# Patient Record
Sex: MALE | Race: WHITE | Employment: FULL TIME | URBAN - METROPOLITAN AREA
[De-identification: names, ages, dates, MRNs, and addresses within clinical notes are randomized per-mention and may not be internally consistent; named-entity substitution may affect disease eponyms.]

---

## 2017-02-15 ENCOUNTER — APPOINTMENT (OUTPATIENT)
Dept: LAB | Age: 52
End: 2017-02-15
Attending: DERMATOLOGY
Payer: COMMERCIAL

## 2017-02-15 DIAGNOSIS — D48.5 NEOPLASM OF UNCERTAIN BEHAVIOR OF SKIN: ICD-10-CM

## 2017-02-15 PROCEDURE — 88342 IMHCHEM/IMCYTCHM 1ST ANTB: CPT

## 2017-04-13 ENCOUNTER — LAB ENCOUNTER (OUTPATIENT)
Dept: LAB | Age: 52
End: 2017-04-13
Attending: FAMILY MEDICINE
Payer: COMMERCIAL

## 2017-04-13 DIAGNOSIS — E11.9 DIABETES MELLITUS (HCC): Primary | ICD-10-CM

## 2017-04-13 PROCEDURE — 82570 ASSAY OF URINE CREATININE: CPT

## 2017-04-13 PROCEDURE — 82043 UR ALBUMIN QUANTITATIVE: CPT

## 2017-06-10 ENCOUNTER — HOSPITAL ENCOUNTER (EMERGENCY)
Facility: HOSPITAL | Age: 52
Discharge: HOME | End: 2017-06-10
Attending: EMERGENCY MEDICINE
Payer: COMMERCIAL

## 2017-06-10 VITALS
RESPIRATION RATE: 20 BRPM | DIASTOLIC BLOOD PRESSURE: 68 MMHG | OXYGEN SATURATION: 97 % | HEIGHT: 72 IN | BODY MASS INDEX: 40.63 KG/M2 | TEMPERATURE: 99 F | HEART RATE: 72 BPM | WEIGHT: 300 LBS | SYSTOLIC BLOOD PRESSURE: 126 MMHG

## 2017-06-10 DIAGNOSIS — L97.509: Primary | ICD-10-CM

## 2017-06-10 DIAGNOSIS — E13.621: Primary | ICD-10-CM

## 2017-06-10 DIAGNOSIS — E87.6 HYPOKALEMIA: ICD-10-CM

## 2017-06-10 PROCEDURE — 85025 COMPLETE CBC W/AUTO DIFF WBC: CPT | Performed by: EMERGENCY MEDICINE

## 2017-06-10 PROCEDURE — 85007 BL SMEAR W/DIFF WBC COUNT: CPT | Performed by: EMERGENCY MEDICINE

## 2017-06-10 PROCEDURE — 99284 EMERGENCY DEPT VISIT MOD MDM: CPT

## 2017-06-10 PROCEDURE — S0077 INJECTION, CLINDAMYCIN PHOSP: HCPCS | Performed by: EMERGENCY MEDICINE

## 2017-06-10 PROCEDURE — 80048 BASIC METABOLIC PNL TOTAL CA: CPT | Performed by: EMERGENCY MEDICINE

## 2017-06-10 PROCEDURE — 85610 PROTHROMBIN TIME: CPT | Performed by: EMERGENCY MEDICINE

## 2017-06-10 PROCEDURE — 85027 COMPLETE CBC AUTOMATED: CPT | Performed by: EMERGENCY MEDICINE

## 2017-06-10 PROCEDURE — 96365 THER/PROPH/DIAG IV INF INIT: CPT

## 2017-06-10 RX ORDER — CLINDAMYCIN HYDROCHLORIDE 300 MG/1
300 CAPSULE ORAL 3 TIMES DAILY
Qty: 30 CAPSULE | Refills: 0 | Status: SHIPPED | OUTPATIENT
Start: 2017-06-10 | End: 2017-06-20

## 2017-06-10 RX ORDER — CLINDAMYCIN PHOSPHATE 600 MG/50ML
600 INJECTION INTRAVENOUS ONCE
Status: COMPLETED | OUTPATIENT
Start: 2017-06-10 | End: 2017-06-10

## 2017-06-10 RX ORDER — POTASSIUM CHLORIDE 20 MEQ/1
40 TABLET, EXTENDED RELEASE ORAL ONCE
Status: COMPLETED | OUTPATIENT
Start: 2017-06-10 | End: 2017-06-10

## 2017-06-10 RX ORDER — DIAPER,BRIEF,INFANT-TODD,DISP
EACH MISCELLANEOUS AS NEEDED
Status: DISCONTINUED | OUTPATIENT
Start: 2017-06-10 | End: 2017-06-11

## 2017-06-11 NOTE — ED PROVIDER NOTES
Patient Seen in: [facility] AND [facility] Emergency Department    History   Patient presents with: Toe Pain    Stated Complaint: right big toe infection     HPI    Pt is a 44 yo M who p/w right great toe blister that is progressively worse ×2 days.   Denies any (5 mg total) by mouth once daily. Potassium Chloride ER (KLOR-CON M20) 20 MEQ Oral Tab CR,  Take 1 tablet (20 mEq total) by mouth once daily.    DiltiaZEM HCl ER Coated Beads 240 MG Oral Capsule SR 24 Hr,  Take 1 capsule (240 mg total) by mouth 2 (two) ti Resp 06/10/17 1949 20   Temp 06/10/17 1949 98.8 °F (37.1 °C)   Temp src --    SpO2 06/10/17 2139 94 %   O2 Device 06/10/17 2139 None (Room air)       Current:/68 mmHg  Pulse 68  Temp(Src) 98.8 °F (37.1 °C)  Resp 19  Ht 182.9 cm (6')  Wt 136.079 kg given clindamycin IV and advised to start oral antibiotics with close f/u next week. Pt does have appointment with podiatry in 2 weeks as well. Advised to check INR next week and evaluate for worsening signs of infection daily.    POtassium repleted, pt not

## 2017-06-23 PROBLEM — L97.519 ULCER OF RIGHT GREAT TOE DUE TO DIABETES MELLITUS (HCC): Status: ACTIVE | Noted: 2017-06-23

## 2017-06-23 PROBLEM — E11.621 ULCER OF RIGHT GREAT TOE DUE TO DIABETES MELLITUS (HCC): Status: ACTIVE | Noted: 2017-06-23

## 2018-07-14 PROCEDURE — 81003 URINALYSIS AUTO W/O SCOPE: CPT | Performed by: FAMILY MEDICINE

## 2018-07-17 PROBLEM — E11.42 DIABETIC POLYNEUROPATHY ASSOCIATED WITH TYPE 2 DIABETES MELLITUS (HCC): Status: ACTIVE | Noted: 2018-07-17

## 2018-07-17 PROBLEM — K42.9 UMBILICAL HERNIA WITHOUT OBSTRUCTION AND WITHOUT GANGRENE: Status: ACTIVE | Noted: 2018-07-17

## 2019-05-21 PROBLEM — E55.9 VITAMIN D DEFICIENCY: Status: ACTIVE | Noted: 2019-05-21

## 2019-05-21 PROBLEM — L84 FOOT CALLUS: Status: ACTIVE | Noted: 2019-05-21

## 2020-07-24 PROBLEM — R06.00 DOE (DYSPNEA ON EXERTION): Status: ACTIVE | Noted: 2020-07-24

## 2020-07-24 PROBLEM — R06.09 DOE (DYSPNEA ON EXERTION): Status: ACTIVE | Noted: 2020-07-24

## 2020-08-20 RX ORDER — CHOLECALCIFEROL (VITAMIN D3) 50 MCG
TABLET ORAL DAILY
COMMUNITY

## 2020-08-20 RX ORDER — FUROSEMIDE 20 MG/1
20 TABLET ORAL
COMMUNITY
End: 2020-10-15

## 2020-08-21 ENCOUNTER — LAB ENCOUNTER (OUTPATIENT)
Dept: LAB | Facility: HOSPITAL | Age: 55
End: 2020-08-21
Attending: INTERNAL MEDICINE
Payer: COMMERCIAL

## 2020-08-21 DIAGNOSIS — Z20.822 ENCOUNTER FOR PREOPERATIVE SCREENING LABORATORY TESTING FOR COVID-19 VIRUS: Primary | ICD-10-CM

## 2020-08-21 DIAGNOSIS — Z01.812 ENCOUNTER FOR PREOPERATIVE SCREENING LABORATORY TESTING FOR COVID-19 VIRUS: Primary | ICD-10-CM

## 2020-08-22 LAB — SARS-COV-2 RNA RESP QL NAA+PROBE: NOT DETECTED

## 2020-08-24 ENCOUNTER — HOSPITAL ENCOUNTER (OUTPATIENT)
Dept: INTERVENTIONAL RADIOLOGY/VASCULAR | Facility: HOSPITAL | Age: 55
Discharge: HOME | End: 2020-08-24
Attending: INTERNAL MEDICINE | Admitting: INTERNAL MEDICINE
Payer: COMMERCIAL

## 2020-08-24 VITALS
SYSTOLIC BLOOD PRESSURE: 137 MMHG | HEART RATE: 64 BPM | OXYGEN SATURATION: 94 % | WEIGHT: 315 LBS | DIASTOLIC BLOOD PRESSURE: 69 MMHG | TEMPERATURE: 99 F | RESPIRATION RATE: 17 BRPM | BODY MASS INDEX: 43 KG/M2

## 2020-08-24 DIAGNOSIS — R06.00 DYSPNEA ON EXERTION: ICD-10-CM

## 2020-08-24 DIAGNOSIS — R94.39 ABNORMAL NUCLEAR STRESS TEST: ICD-10-CM

## 2020-08-24 DIAGNOSIS — Z01.818 PREOP TESTING: Primary | ICD-10-CM

## 2020-08-24 LAB
GLUCOSE BLDC GLUCOMTR-MCNC: 128 MG/DL (ref 70–99)
INR BLD: 1.74 (ref 0.9–1.2)
PROTHROMBIN TIME: 20 SECONDS (ref 11.8–14.5)

## 2020-08-24 PROCEDURE — 82962 GLUCOSE BLOOD TEST: CPT

## 2020-08-24 PROCEDURE — 85610 PROTHROMBIN TIME: CPT | Performed by: INTERNAL MEDICINE

## 2020-08-24 PROCEDURE — 99152 MOD SED SAME PHYS/QHP 5/>YRS: CPT

## 2020-08-24 PROCEDURE — 4A023N7 MEASUREMENT OF CARDIAC SAMPLING AND PRESSURE, LEFT HEART, PERCUTANEOUS APPROACH: ICD-10-PCS | Performed by: INTERNAL MEDICINE

## 2020-08-24 PROCEDURE — 36415 COLL VENOUS BLD VENIPUNCTURE: CPT

## 2020-08-24 PROCEDURE — 93458 L HRT ARTERY/VENTRICLE ANGIO: CPT

## 2020-08-24 PROCEDURE — B2151ZZ FLUOROSCOPY OF LEFT HEART USING LOW OSMOLAR CONTRAST: ICD-10-PCS | Performed by: INTERNAL MEDICINE

## 2020-08-24 PROCEDURE — B2111ZZ FLUOROSCOPY OF MULTIPLE CORONARY ARTERIES USING LOW OSMOLAR CONTRAST: ICD-10-PCS | Performed by: INTERNAL MEDICINE

## 2020-08-24 PROCEDURE — 99153 MOD SED SAME PHYS/QHP EA: CPT

## 2020-08-24 RX ORDER — MIDAZOLAM HYDROCHLORIDE 1 MG/ML
INJECTION INTRAMUSCULAR; INTRAVENOUS
Status: COMPLETED
Start: 2020-08-24 | End: 2020-08-24

## 2020-08-24 RX ORDER — LIDOCAINE HYDROCHLORIDE 20 MG/ML
INJECTION, SOLUTION EPIDURAL; INFILTRATION; INTRACAUDAL; PERINEURAL
Status: COMPLETED
Start: 2020-08-24 | End: 2020-08-24

## 2020-08-24 RX ORDER — SODIUM CHLORIDE 9 MG/ML
1 INJECTION, SOLUTION INTRAVENOUS CONTINUOUS
Status: DISCONTINUED | OUTPATIENT
Start: 2020-08-24 | End: 2020-08-24

## 2020-08-24 RX ORDER — ASPIRIN 81 MG/1
TABLET, CHEWABLE ORAL
Status: COMPLETED
Start: 2020-08-24 | End: 2020-08-24

## 2020-08-24 RX ORDER — VERAPAMIL HYDROCHLORIDE 2.5 MG/ML
INJECTION, SOLUTION INTRAVENOUS
Status: COMPLETED
Start: 2020-08-24 | End: 2020-08-24

## 2020-08-24 RX ORDER — ASPIRIN 81 MG/1
324 TABLET, CHEWABLE ORAL ONCE
Status: COMPLETED | OUTPATIENT
Start: 2020-08-24 | End: 2020-08-24

## 2020-08-24 RX ORDER — NITROGLYCERIN 20 MG/100ML
INJECTION INTRAVENOUS
Status: COMPLETED
Start: 2020-08-24 | End: 2020-08-24

## 2020-08-24 RX ORDER — SODIUM CHLORIDE 9 MG/ML
INJECTION, SOLUTION INTRAVENOUS CONTINUOUS
Status: DISCONTINUED | OUTPATIENT
Start: 2020-08-24 | End: 2020-08-24

## 2020-08-24 RX ADMIN — SODIUM CHLORIDE 1 ML/KG/HR: 9 INJECTION, SOLUTION INTRAVENOUS at 06:45:00

## 2020-08-24 RX ADMIN — ASPIRIN: 81 TABLET, CHEWABLE ORAL at 07:15:00

## 2020-08-24 NOTE — INTERVAL H&P NOTE
Pre-op Diagnosis: * No pre-op diagnosis entered *    The above referenced H&P was reviewed by Marcella Stevens MD on 8/24/2020, the patient was examined and no significant changes have occurred in the patient's condition since the H&P was performed.   I discuss

## 2020-08-24 NOTE — DIETARY NOTE
NUTRITION EDUCATION NOTE    Received consult for nutrition education per cardiac rehab order set. Appropriate education and handout(s) provided. See education section of Epic for specifics.     Irma Angel RDN, LDN  Clinical Nutrition  Ext [phone]

## 2020-08-24 NOTE — IVS NOTE
Patient is able to sit up and ambulate without difficulty. Patient voided and tolerated food/fluids. VSS. Procedure site remains dry and intact with good circulation, motion, sensation. No signs/symptoms of bleeding noted.    Dr Reyna Grayson MD spoke with jemima

## 2020-08-30 ENCOUNTER — HOSPITAL ENCOUNTER (OUTPATIENT)
Facility: HOSPITAL | Age: 55
Setting detail: OBSERVATION
Discharge: HOME | End: 2020-09-03
Attending: HOSPITALIST | Admitting: HOSPITALIST
Payer: COMMERCIAL

## 2020-08-30 PROBLEM — K35.80 ACUTE APPENDICITIS: Status: ACTIVE | Noted: 2020-08-30

## 2020-08-30 LAB
GLUCOSE BLDC GLUCOMTR-MCNC: 128 MG/DL (ref 70–99)
SARS-COV-2 RNA RESP QL NAA+PROBE: NOT DETECTED

## 2020-08-30 PROCEDURE — 82962 GLUCOSE BLOOD TEST: CPT

## 2020-08-30 RX ORDER — ACETAMINOPHEN 325 MG/1
650 TABLET ORAL EVERY 6 HOURS PRN
Status: DISCONTINUED | OUTPATIENT
Start: 2020-08-30 | End: 2020-09-03

## 2020-08-30 RX ORDER — HYDROMORPHONE HYDROCHLORIDE 1 MG/ML
1 INJECTION, SOLUTION INTRAMUSCULAR; INTRAVENOUS; SUBCUTANEOUS
Status: DISCONTINUED | OUTPATIENT
Start: 2020-08-30 | End: 2020-09-03

## 2020-08-30 RX ORDER — SODIUM CHLORIDE 0.9 % (FLUSH) 0.9 %
3 SYRINGE (ML) INJECTION AS NEEDED
Status: DISCONTINUED | OUTPATIENT
Start: 2020-08-30 | End: 2020-09-03

## 2020-08-30 RX ORDER — NICOTINE 21 MG/24HR
1 PATCH, TRANSDERMAL 24 HOURS TRANSDERMAL DAILY
Status: DISCONTINUED | OUTPATIENT
Start: 2020-08-30 | End: 2020-09-03

## 2020-08-30 RX ORDER — HYDROCODONE BITARTRATE AND ACETAMINOPHEN 5; 325 MG/1; MG/1
1 TABLET ORAL EVERY 6 HOURS PRN
Status: DISCONTINUED | OUTPATIENT
Start: 2020-08-30 | End: 2020-09-03

## 2020-08-30 RX ORDER — ATORVASTATIN CALCIUM 10 MG/1
10 TABLET, FILM COATED ORAL NIGHTLY
Status: DISCONTINUED | OUTPATIENT
Start: 2020-08-30 | End: 2020-09-03

## 2020-08-30 RX ORDER — SODIUM CHLORIDE 9 MG/ML
INJECTION, SOLUTION INTRAVENOUS CONTINUOUS
Status: DISCONTINUED | OUTPATIENT
Start: 2020-08-30 | End: 2020-09-03

## 2020-08-30 RX ORDER — POLYETHYLENE GLYCOL 3350 17 G/17G
17 POWDER, FOR SOLUTION ORAL DAILY PRN
Status: DISCONTINUED | OUTPATIENT
Start: 2020-08-30 | End: 2020-09-01

## 2020-08-30 RX ORDER — DEXTROSE MONOHYDRATE 25 G/50ML
50 INJECTION, SOLUTION INTRAVENOUS
Status: DISCONTINUED | OUTPATIENT
Start: 2020-08-30 | End: 2020-09-03

## 2020-08-30 RX ORDER — HYDROMORPHONE HYDROCHLORIDE 1 MG/ML
0.5 INJECTION, SOLUTION INTRAMUSCULAR; INTRAVENOUS; SUBCUTANEOUS
Status: DISCONTINUED | OUTPATIENT
Start: 2020-08-30 | End: 2020-09-03

## 2020-08-30 RX ORDER — ALLOPURINOL 300 MG/1
300 TABLET ORAL DAILY
Status: DISCONTINUED | OUTPATIENT
Start: 2020-08-31 | End: 2020-09-03

## 2020-08-30 RX ORDER — ONDANSETRON 2 MG/ML
4 INJECTION INTRAMUSCULAR; INTRAVENOUS EVERY 6 HOURS PRN
Status: DISCONTINUED | OUTPATIENT
Start: 2020-08-30 | End: 2020-09-03

## 2020-08-30 RX ORDER — PANTOPRAZOLE SODIUM 40 MG/1
40 TABLET, DELAYED RELEASE ORAL
Status: DISCONTINUED | OUTPATIENT
Start: 2020-08-30 | End: 2020-09-03

## 2020-08-30 RX ORDER — BISACODYL 10 MG
10 SUPPOSITORY, RECTAL RECTAL
Status: DISCONTINUED | OUTPATIENT
Start: 2020-08-30 | End: 2020-09-03

## 2020-08-30 NOTE — PROGRESS NOTES
[facility] Pharmacy Note: Antimicrobial Weight Based Dose Adjustment for: piperacillin/tazobactam (Ino Mcdermott)    Minh Fisher is a 60year old patient who has been prescribed piperacillin/tazobactam (ZOSYN) 3.375 g every 8 hours.     Estimated Creatinine Clearance:

## 2020-08-30 NOTE — RESPIRATORY THERAPY NOTE
Pt states he wears CPAP at home and wants to use it during his hospital stay. Pt uses nasal mask with CPAP of 15 cmH2O.

## 2020-08-30 NOTE — CONSULTS
[address]  AGE:[birth year]  UCQ:[id number]  LOS:0    Date of Admission:  8/30/2020  Date of Consult:  8/30/2020 drugs. Allergies:    Lovenox [Enoxaparin]    RASH  Heparin                 RASH    Medications:   No current facility-administered medications for this encounter. Review of Systems:   Pertinent items are noted in HPI.   Constitutional: negative  Eye 9-4-15 / EXP 12-3-15     Status post arthroscopy of right knee     Knee effusion, right     Displacement of lumbar intervertebral disc without myelopathy     Tear of meniscus of right knee, subsequent encounter     Essential hypertension with goal blood pr counseling/coordination of care:  45 Minutes  Total time spent with patient:  39 Minutes   D/w Dr. Bonifacio Morris

## 2020-08-30 NOTE — H&P
[facility] Hospitalist H&P       CC: abd pain     PCP: Stacie Flores DO    History of Present Illness: Patient is a 60year old male with PMH sig for DM c/b nephropathy, HTN, HLD, persistent Afib on warfarin, JACKY, morbid obesity, CKD stage 3 baseline cre 1.2-1. week      Drinks per session: 3 or 4      Binge frequency: Never      Comment: social       Fam Hx  Family History   Problem Relation Age of Onset   • Diabetes Father    • Heart Disorder Father         MI   • Other (Other) Father         renal failure   • male with PMH sig for DM c/b nephropathy, HTN, HLD, persistent Afib on warfarin, JACKY, morbid obesity, CKD stage 3 baseline cre 1.2-1.6, active tobacco use, recent normal angiogram, who presents with RLQ abd pain, noted to have acute appendicitis on CT. as outlined    Thank Gely Yoder MD    [facility] Hospitalist  Answering Service number: [phone]

## 2020-08-31 LAB
ALBUMIN SERPL-MCNC: 2.7 G/DL (ref 3.4–5)
ALBUMIN/GLOB SERPL: 0.7 {RATIO} (ref 1–2)
ALP LIVER SERPL-CCNC: 67 U/L (ref 45–117)
ALT SERPL-CCNC: 19 U/L (ref 16–61)
ANION GAP SERPL CALC-SCNC: 4 MMOL/L (ref 0–18)
APTT PPP: 35.1 SECONDS (ref 23.2–35.3)
AST SERPL-CCNC: 16 U/L (ref 15–37)
BASOPHILS # BLD AUTO: 0.05 X10(3) UL (ref 0–0.2)
BASOPHILS NFR BLD AUTO: 0.4 %
BILIRUB SERPL-MCNC: 1.6 MG/DL (ref 0.1–2)
BUN BLD-MCNC: 30 MG/DL (ref 7–18)
BUN/CREAT SERPL: 18.6 (ref 10–20)
CALCIUM BLD-MCNC: 8.7 MG/DL (ref 8.5–10.1)
CHLORIDE SERPL-SCNC: 99 MMOL/L (ref 98–112)
CO2 SERPL-SCNC: 32 MMOL/L (ref 21–32)
CREAT BLD-MCNC: 1.61 MG/DL (ref 0.7–1.3)
DEPRECATED RDW RBC AUTO: 42.1 FL (ref 35.1–46.3)
EOSINOPHIL # BLD AUTO: 0.06 X10(3) UL (ref 0–0.7)
EOSINOPHIL NFR BLD AUTO: 0.5 %
ERYTHROCYTE [DISTWIDTH] IN BLOOD BY AUTOMATED COUNT: 12.9 % (ref 11–15)
EST. AVERAGE GLUCOSE BLD GHB EST-MCNC: 146 MG/DL (ref 68–126)
GLOBULIN PLAS-MCNC: 3.8 G/DL (ref 2.8–4.4)
GLUCOSE BLD-MCNC: 114 MG/DL (ref 70–99)
GLUCOSE BLDC GLUCOMTR-MCNC: 117 MG/DL (ref 70–99)
GLUCOSE BLDC GLUCOMTR-MCNC: 123 MG/DL (ref 70–99)
GLUCOSE BLDC GLUCOMTR-MCNC: 131 MG/DL (ref 70–99)
GLUCOSE BLDC GLUCOMTR-MCNC: 135 MG/DL (ref 70–99)
GLUCOSE BLDC GLUCOMTR-MCNC: 166 MG/DL (ref 70–99)
GLUCOSE BLDC GLUCOMTR-MCNC: 167 MG/DL (ref 70–99)
HAV IGM SER QL: 1.4 MG/DL (ref 1.6–2.6)
HBA1C MFR BLD HPLC: 6.7 % (ref ?–5.7)
HCT VFR BLD AUTO: 46.2 % (ref 39–53)
HGB BLD-MCNC: 15.7 G/DL (ref 13–17.5)
IMM GRANULOCYTES # BLD AUTO: 0.05 X10(3) UL (ref 0–1)
IMM GRANULOCYTES NFR BLD: 0.4 %
INR BLD: 1.71 (ref 0.9–1.2)
LDH SERPL L TO P-CCNC: 152 U/L
LYMPHOCYTES # BLD AUTO: 1.45 X10(3) UL (ref 1–4)
LYMPHOCYTES NFR BLD AUTO: 11.5 %
M PROTEIN MFR SERPL ELPH: 6.5 G/DL (ref 6.4–8.2)
MCH RBC QN AUTO: 30.2 PG (ref 26–34)
MCHC RBC AUTO-ENTMCNC: 34 G/DL (ref 31–37)
MCV RBC AUTO: 88.8 FL (ref 80–100)
MONOCYTES # BLD AUTO: 1.02 X10(3) UL (ref 0.1–1)
MONOCYTES NFR BLD AUTO: 8.1 %
NEUTROPHILS # BLD AUTO: 10.03 X10 (3) UL (ref 1.5–7.7)
NEUTROPHILS # BLD AUTO: 10.03 X10(3) UL (ref 1.5–7.7)
NEUTROPHILS NFR BLD AUTO: 79.1 %
OSMOLALITY SERPL CALC.SUM OF ELEC: 287 MOSM/KG (ref 275–295)
PHOSPHATE SERPL-MCNC: 1.8 MG/DL (ref 2.5–4.9)
PLATELET # BLD AUTO: 215 10(3)UL (ref 150–450)
POTASSIUM SERPL-SCNC: 3.5 MMOL/L (ref 3.5–5.1)
PROTHROMBIN TIME: 19.8 SECONDS (ref 11.8–14.5)
RBC # BLD AUTO: 5.2 X10(6)UL (ref 4.3–5.7)
SODIUM SERPL-SCNC: 135 MMOL/L (ref 136–145)
WBC # BLD AUTO: 12.7 X10(3) UL (ref 4–11)

## 2020-08-31 PROCEDURE — 82962 GLUCOSE BLOOD TEST: CPT

## 2020-08-31 PROCEDURE — 83615 LACTATE (LD) (LDH) ENZYME: CPT | Performed by: SURGERY

## 2020-08-31 PROCEDURE — 85610 PROTHROMBIN TIME: CPT | Performed by: HOSPITALIST

## 2020-08-31 PROCEDURE — 80053 COMPREHEN METABOLIC PANEL: CPT | Performed by: HOSPITALIST

## 2020-08-31 PROCEDURE — 85025 COMPLETE CBC W/AUTO DIFF WBC: CPT | Performed by: HOSPITALIST

## 2020-08-31 PROCEDURE — 84100 ASSAY OF PHOSPHORUS: CPT | Performed by: SURGERY

## 2020-08-31 PROCEDURE — 94660 CPAP INITIATION&MGMT: CPT

## 2020-08-31 PROCEDURE — 85730 THROMBOPLASTIN TIME PARTIAL: CPT | Performed by: SURGERY

## 2020-08-31 PROCEDURE — 83735 ASSAY OF MAGNESIUM: CPT | Performed by: SURGERY

## 2020-08-31 PROCEDURE — 83036 HEMOGLOBIN GLYCOSYLATED A1C: CPT | Performed by: HOSPITALIST

## 2020-08-31 RX ORDER — POTASSIUM CHLORIDE 20 MEQ/1
40 TABLET, EXTENDED RELEASE ORAL EVERY 4 HOURS
Status: COMPLETED | OUTPATIENT
Start: 2020-08-31 | End: 2020-08-31

## 2020-08-31 RX ORDER — MAGNESIUM OXIDE 400 MG (241.3 MG MAGNESIUM) TABLET
800 TABLET ONCE
Status: COMPLETED | OUTPATIENT
Start: 2020-08-31 | End: 2020-08-31

## 2020-08-31 NOTE — PLAN OF CARE
Patient was admitted during the day for appendicitis. Surgery on consult. Alert & oriented x4. Room air, uses CPAP at night. Tele in place, no calls received. Pain being managed with dilaudid prn. NPO w/sips for meds. Accu q6. IV Zosyn. IVF infusing.  Rey Bell distraction and/or relaxation techniques  - Monitor for opioid side effects  - Notify MD/LIP if interventions unsuccessful or patient reports new pain  - Anticipate increased pain with activity and pre-medicate as appropriate  Outcome: Progressing     Prob

## 2020-08-31 NOTE — PLAN OF CARE
Pt alert and oriented x4. VSS. On room air. Tolerating diet advancement, no nausea. Accucheks ACHS. Voiding WNL. No complaints of pain, mild gas discomfort managed with ambulating. IV zosyn and fluids as ordered. Electrolyte coverage, see MAR.  Ambulating i Manage/alleviate anxiety  - Utilize distraction and/or relaxation techniques  - Monitor for opioid side effects  - Notify MD/LIP if interventions unsuccessful or patient reports new pain  - Anticipate increased pain with activity and pre-medicate as approp

## 2020-08-31 NOTE — PROGRESS NOTES
[facility] Hospitalist Progress Note     CC: Hospital Follow up    PCP: Pebbles Valdez DO       Assessment/Plan:     Active Problems:    Acute appendicitis    Mr. Adriana Valentino is a 60year old male with PMH sig for DM c/b nephropathy, HTN, HLD, persistent Afib on warfari clinical course.   [facility] hospitalist to continue to follow patient while in house     Patient and/or patient's family given opportunity to ask questions and note understanding and agreeing with therapeutic plan as outlined     Onetha Sicard, MD  [facility] Abdomen+pelvis(cpt=74176)    Result Date: 8/30/2020  IMPRESSION: 1. Acute uncomplicated appendicitis. 2.  Hepatic steatosis. 3.  Diverticulosis coli.  This report was telephoned to Dr. Pancho Bianchi at the time of dictation, 8/30/2020 12:42 PM        Meds

## 2020-08-31 NOTE — PROGRESS NOTES
[facility] [place]    General Surgery Progress Note  Maynor Min  [facility]  # 0       Subjective:   Feels better overall, RLQ abdominal pain and denies N/V  Passing flatus      Exam:     General: awake and alert, in no acute distress, co 08/31/20  0500   BP: 125/75   128/83   Pulse: 74 72 73 76   Resp: 20   20   Temp:    99.7 °F (37.6 °C)   TempSrc:    Oral   SpO2: 95% 94% 94% 98%   Weight:         Body mass index is 44.17 kg/m².      No intake or output data in the 24 hours ending 08/31/20

## 2020-08-31 NOTE — PLAN OF CARE
Patient admitted from [facility]. Denies pain at this time. IVF started- patient NPO and aware of plan. IV Zosyn infusing. Blood sugar monitored per order. Safety measures in place and call light within reach.

## 2020-09-01 LAB
ANION GAP SERPL CALC-SCNC: 5 MMOL/L (ref 0–18)
BASOPHILS # BLD AUTO: 0.05 X10(3) UL (ref 0–0.2)
BASOPHILS NFR BLD AUTO: 0.5 %
BUN BLD-MCNC: 16 MG/DL (ref 7–18)
BUN/CREAT SERPL: 12.1 (ref 10–20)
C DIFF TOX B STL QL: NEGATIVE
CALCIUM BLD-MCNC: 8.5 MG/DL (ref 8.5–10.1)
CHLORIDE SERPL-SCNC: 102 MMOL/L (ref 98–112)
CO2 SERPL-SCNC: 29 MMOL/L (ref 21–32)
CREAT BLD-MCNC: 1.32 MG/DL (ref 0.7–1.3)
DEPRECATED RDW RBC AUTO: 41.8 FL (ref 35.1–46.3)
EOSINOPHIL # BLD AUTO: 0.12 X10(3) UL (ref 0–0.7)
EOSINOPHIL NFR BLD AUTO: 1.1 %
ERYTHROCYTE [DISTWIDTH] IN BLOOD BY AUTOMATED COUNT: 12.8 % (ref 11–15)
GLUCOSE BLD-MCNC: 129 MG/DL (ref 70–99)
GLUCOSE BLDC GLUCOMTR-MCNC: 136 MG/DL (ref 70–99)
GLUCOSE BLDC GLUCOMTR-MCNC: 139 MG/DL (ref 70–99)
GLUCOSE BLDC GLUCOMTR-MCNC: 146 MG/DL (ref 70–99)
GLUCOSE BLDC GLUCOMTR-MCNC: 157 MG/DL (ref 70–99)
HAV IGM SER QL: 1.4 MG/DL (ref 1.6–2.6)
HCT VFR BLD AUTO: 41.6 % (ref 39–53)
HGB BLD-MCNC: 14.1 G/DL (ref 13–17.5)
IMM GRANULOCYTES # BLD AUTO: 0.05 X10(3) UL (ref 0–1)
IMM GRANULOCYTES NFR BLD: 0.5 %
INR BLD: 1.67 (ref 0.9–1.2)
LYMPHOCYTES # BLD AUTO: 1.2 X10(3) UL (ref 1–4)
LYMPHOCYTES NFR BLD AUTO: 10.8 %
MCH RBC QN AUTO: 30.2 PG (ref 26–34)
MCHC RBC AUTO-ENTMCNC: 33.9 G/DL (ref 31–37)
MCV RBC AUTO: 89.1 FL (ref 80–100)
MONOCYTES # BLD AUTO: 0.83 X10(3) UL (ref 0.1–1)
MONOCYTES NFR BLD AUTO: 7.5 %
NEUTROPHILS # BLD AUTO: 8.81 X10 (3) UL (ref 1.5–7.7)
NEUTROPHILS # BLD AUTO: 8.81 X10(3) UL (ref 1.5–7.7)
NEUTROPHILS NFR BLD AUTO: 79.6 %
OSMOLALITY SERPL CALC.SUM OF ELEC: 285 MOSM/KG (ref 275–295)
PHOSPHATE SERPL-MCNC: 1.5 MG/DL (ref 2.5–4.9)
PLATELET # BLD AUTO: 183 10(3)UL (ref 150–450)
POTASSIUM SERPL-SCNC: 3.6 MMOL/L (ref 3.5–5.1)
PROTHROMBIN TIME: 19.4 SECONDS (ref 11.8–14.5)
RBC # BLD AUTO: 4.67 X10(6)UL (ref 4.3–5.7)
SODIUM SERPL-SCNC: 136 MMOL/L (ref 136–145)
WBC # BLD AUTO: 11.1 X10(3) UL (ref 4–11)

## 2020-09-01 PROCEDURE — 87493 C DIFF AMPLIFIED PROBE: CPT | Performed by: HOSPITALIST

## 2020-09-01 PROCEDURE — 85610 PROTHROMBIN TIME: CPT | Performed by: HOSPITALIST

## 2020-09-01 PROCEDURE — 82962 GLUCOSE BLOOD TEST: CPT

## 2020-09-01 PROCEDURE — 84100 ASSAY OF PHOSPHORUS: CPT | Performed by: HOSPITALIST

## 2020-09-01 PROCEDURE — 80048 BASIC METABOLIC PNL TOTAL CA: CPT | Performed by: HOSPITALIST

## 2020-09-01 PROCEDURE — 83735 ASSAY OF MAGNESIUM: CPT | Performed by: HOSPITALIST

## 2020-09-01 PROCEDURE — 85025 COMPLETE CBC W/AUTO DIFF WBC: CPT | Performed by: HOSPITALIST

## 2020-09-01 RX ORDER — POTASSIUM CHLORIDE 20 MEQ/1
40 TABLET, EXTENDED RELEASE ORAL EVERY 4 HOURS
Status: COMPLETED | OUTPATIENT
Start: 2020-09-01 | End: 2020-09-01

## 2020-09-01 RX ORDER — MAGNESIUM OXIDE 400 MG (241.3 MG MAGNESIUM) TABLET
800 TABLET ONCE
Status: COMPLETED | OUTPATIENT
Start: 2020-09-01 | End: 2020-09-01

## 2020-09-01 NOTE — PROGRESS NOTES
[facility] Hospitalist Progress Note     CC: Hospital Follow up    PCP: Rossi Gleason DO       Assessment/Plan:     Active Problems:    Acute appendicitis    Mr. Lemont Siemens is a 60year old male with PMH sig for DM c/b nephropathy, HTN, HLD, persistent Afib on warfari reviewed.      Further recommendations pending patient's clinical course.   [facility] hospitalist to continue to follow patient while in house     Patient and/or patient's family given opportunity to ask questions and note understanding and agreeing with therapeu 99 102   CO2 29.6* 32.0 29.0       Recent Labs   Lab 08/30/20  1156 08/31/20  0548   ALT 20 19   AST 22 16   ALB 3.6 2.7*   LDH  --  152         Imaging:  Ct Abdomen+pelvis(cpt=74176)    Result Date: 8/30/2020  IMPRESSION: 1.   Acute uncomplicated appendici

## 2020-09-01 NOTE — PROGRESS NOTES
[facility]    General Surgery Progress Note  Moisés Greenfield  ISMAEL:[id number]  HD# 0       Subjective:   Feels better, states less RLQ abdominal pain and denies N/V  Passing flatus and diarrhea      Exam:     General: awake and alert, in no a patient and his wife consented to the treatment plan. I will follow closely.       Objective:      08/31/20 2006 08/31/20 2048 08/31/20 2231 09/01/20 0410   BP: (!) 150/91   132/74   Pulse: 94   92   Resp: 20   16   Temp: (!) 100.5 °F (38.1 °C) 99.6

## 2020-09-01 NOTE — PLAN OF CARE
Minimal pain, tolerating soft low fiber diet. Low grade temp. Continues on IV Zosyn. Frequent loose/watery bowel movements - stool sent for C-diff per protocol, results pending. Enteric precautions maintained pending C-dif results.     Problem: Patient C Notify MD/LIP if interventions unsuccessful or patient reports new pain  - Anticipate increased pain with activity and pre-medicate as appropriate  Outcome: Progressing     Problem: SAFETY ADULT - FALL  Goal: Free from fall injury  Description  INTERVENTIO medications  - Encourage mobilization and activity  - Obtain nutritional consult as needed  - Establish a toileting routine/schedule  - Consider collaborating with pharmacy to review patient's medication profile  Outcome: Not Progressing

## 2020-09-02 LAB
ANION GAP SERPL CALC-SCNC: 6 MMOL/L (ref 0–18)
BASOPHILS # BLD AUTO: 0.05 X10(3) UL (ref 0–0.2)
BASOPHILS NFR BLD AUTO: 0.6 %
BUN BLD-MCNC: 13 MG/DL (ref 7–18)
BUN/CREAT SERPL: 10.5 (ref 10–20)
CALCIUM BLD-MCNC: 8.8 MG/DL (ref 8.5–10.1)
CHLORIDE SERPL-SCNC: 105 MMOL/L (ref 98–112)
CO2 SERPL-SCNC: 26 MMOL/L (ref 21–32)
CREAT BLD-MCNC: 1.24 MG/DL (ref 0.7–1.3)
DEPRECATED RDW RBC AUTO: 42 FL (ref 35.1–46.3)
EOSINOPHIL # BLD AUTO: 0.24 X10(3) UL (ref 0–0.7)
EOSINOPHIL NFR BLD AUTO: 2.9 %
ERYTHROCYTE [DISTWIDTH] IN BLOOD BY AUTOMATED COUNT: 12.8 % (ref 11–15)
GLUCOSE BLD-MCNC: 126 MG/DL (ref 70–99)
GLUCOSE BLDC GLUCOMTR-MCNC: 133 MG/DL (ref 70–99)
GLUCOSE BLDC GLUCOMTR-MCNC: 139 MG/DL (ref 70–99)
GLUCOSE BLDC GLUCOMTR-MCNC: 157 MG/DL (ref 70–99)
GLUCOSE BLDC GLUCOMTR-MCNC: 176 MG/DL (ref 70–99)
GLUCOSE BLDC GLUCOMTR-MCNC: 180 MG/DL (ref 70–99)
HAV IGM SER QL: 1.5 MG/DL (ref 1.6–2.6)
HCT VFR BLD AUTO: 41.3 % (ref 39–53)
HGB BLD-MCNC: 14 G/DL (ref 13–17.5)
IMM GRANULOCYTES # BLD AUTO: 0.04 X10(3) UL (ref 0–1)
IMM GRANULOCYTES NFR BLD: 0.5 %
INR BLD: 1.37 (ref 0.9–1.2)
LYMPHOCYTES # BLD AUTO: 1.09 X10(3) UL (ref 1–4)
LYMPHOCYTES NFR BLD AUTO: 13 %
MCH RBC QN AUTO: 30.3 PG (ref 26–34)
MCHC RBC AUTO-ENTMCNC: 33.9 G/DL (ref 31–37)
MCV RBC AUTO: 89.4 FL (ref 80–100)
MONOCYTES # BLD AUTO: 0.7 X10(3) UL (ref 0.1–1)
MONOCYTES NFR BLD AUTO: 8.3 %
NEUTROPHILS # BLD AUTO: 6.28 X10 (3) UL (ref 1.5–7.7)
NEUTROPHILS # BLD AUTO: 6.28 X10(3) UL (ref 1.5–7.7)
NEUTROPHILS NFR BLD AUTO: 74.7 %
OSMOLALITY SERPL CALC.SUM OF ELEC: 286 MOSM/KG (ref 275–295)
PHOSPHATE SERPL-MCNC: 2 MG/DL (ref 2.5–4.9)
PLATELET # BLD AUTO: 205 10(3)UL (ref 150–450)
POTASSIUM SERPL-SCNC: 3.8 MMOL/L (ref 3.5–5.1)
PROTHROMBIN TIME: 16.6 SECONDS (ref 11.8–14.5)
RBC # BLD AUTO: 4.62 X10(6)UL (ref 4.3–5.7)
SODIUM SERPL-SCNC: 137 MMOL/L (ref 136–145)
WBC # BLD AUTO: 8.4 X10(3) UL (ref 4–11)

## 2020-09-02 PROCEDURE — 80048 BASIC METABOLIC PNL TOTAL CA: CPT | Performed by: HOSPITALIST

## 2020-09-02 PROCEDURE — 84100 ASSAY OF PHOSPHORUS: CPT | Performed by: SURGERY

## 2020-09-02 PROCEDURE — 85610 PROTHROMBIN TIME: CPT | Performed by: SURGERY

## 2020-09-02 PROCEDURE — 83735 ASSAY OF MAGNESIUM: CPT | Performed by: SURGERY

## 2020-09-02 PROCEDURE — 82962 GLUCOSE BLOOD TEST: CPT

## 2020-09-02 PROCEDURE — 85025 COMPLETE CBC W/AUTO DIFF WBC: CPT | Performed by: HOSPITALIST

## 2020-09-02 RX ORDER — LISINOPRIL 10 MG/1
10 TABLET ORAL DAILY
Status: DISCONTINUED | OUTPATIENT
Start: 2020-09-02 | End: 2020-09-03

## 2020-09-02 RX ORDER — MAGNESIUM OXIDE 400 MG (241.3 MG MAGNESIUM) TABLET
800 TABLET ONCE
Status: COMPLETED | OUTPATIENT
Start: 2020-09-02 | End: 2020-09-02

## 2020-09-02 RX ORDER — POTASSIUM CHLORIDE 20 MEQ/1
40 TABLET, EXTENDED RELEASE ORAL ONCE
Status: COMPLETED | OUTPATIENT
Start: 2020-09-02 | End: 2020-09-02

## 2020-09-02 NOTE — PROGRESS NOTES
[facility] Hospitalist Progress Note     CC: Hospital Follow up    PCP: Ciara Magaña DO       Assessment/Plan:     Active Problems:    Acute appendicitis    Mr. Damien Bunch is a 60year old male with PMH sig for DM c/b nephropathy, HTN, HLD, persistent Afib on warfari reviewed.      Further recommendations pending patient's clinical course.   [facility] hospitalist to continue to follow patient while in house     Patient and/or patient's family given opportunity to ask questions and note understanding and agreeing with therapeu 3.5 3.6 3.8   CL 99 102 105   CO2 32.0 29.0 26.0       Recent Labs   Lab 08/30/20  1156 08/31/20  0548   ALT 20 19   AST 22 16   ALB 3.6 2.7*   LDH  --  152         Imaging:  Ct Abdomen+pelvis(cpt=74176)    Result Date: 8/30/2020  IMPRESSION: 1.   Acute unc

## 2020-09-02 NOTE — PLAN OF CARE
Pt alert and oriented x4. Pt tearful at bedside d/t mother in law being hospitalized. Support offered. On room air. Tolerating diet, no nausea, pt reports Bms have slowed down since last night. Voiding WNL in toilet. IV zosyn as ordered. IVF as ordered.  El influences on pain and pain management  - Manage/alleviate anxiety  - Utilize distraction and/or relaxation techniques  - Monitor for opioid side effects  - Notify MD/LIP if interventions unsuccessful or patient reports new pain  - Anticipate increased cristhian function  Description  INTERVENTIONS:  - Assess bowel function  - Maintain adequate hydration with IV or PO as ordered and tolerated  - Evaluate effectiveness of GI medications  - Encourage mobilization and activity  - Obtain nutritional consult as needed

## 2020-09-02 NOTE — PROGRESS NOTES
[facility]    General Surgery Progress Note  Sandra carla MOODY:[id number]  # 0       Subjective:   Feels better, denies RLQ abdominal pain and denies N/V  Passing flatus and diarrhea      Exam:     General: awake and alert, in no acute consented to the treatment plan. I will follow closely.       Objective:      09/01/20 2000 09/02/20  0045 09/02/20  0414 09/02/20  0602   BP:    141/73   Pulse: 88  87 93   Resp:    18   Temp:    98.7 °F (37.1 °C)   TempSrc:    Oral   SpO2:  94% 95% 94

## 2020-09-02 NOTE — PLAN OF CARE
Pt is A&O x4, tolerating diet, Accu check AC/HS, denies nausea, IV antibiotics continued, ambulating freely. No calls from tele. Plan for NPO after midnight dt CT of a&p tomorrow (9/3).  Fall precautions maintained, call light within reach, calls appropriat on pain and pain management  - Manage/alleviate anxiety  - Utilize distraction and/or relaxation techniques  - Monitor for opioid side effects  - Notify MD/LIP if interventions unsuccessful or patient reports new pain  - Anticipate increased pain with acti function  Description  INTERVENTIONS:  - Assess bowel function  - Maintain adequate hydration with IV or PO as ordered and tolerated  - Evaluate effectiveness of GI medications  - Encourage mobilization and activity  - Obtain nutritional consult as needed

## 2020-09-02 NOTE — PLAN OF CARE
Patient denies need for pain or nausea medication. IV Zosyn as ordered. Tolerating diet, however NPO after midnight due to CT this morning. Per patient he is passing gas and has had two soft formed stools. Asleep through night with CPap in place.   Prob techniques  - Monitor for opioid side effects  - Notify MD/LIP if interventions unsuccessful or patient reports new pain  - Anticipate increased pain with activity and pre-medicate as appropriate  Outcome: Progressing     Problem: SAFETY ADULT - FALL  Goal tolerated  - Evaluate effectiveness of GI medications  - Encourage mobilization and activity  - Obtain nutritional consult as needed  - Establish a toileting routine/schedule  - Consider collaborating with pharmacy to review patient's medication profile  O

## 2020-09-02 NOTE — PLAN OF CARE
Pt A/O x4. Tolerating diet, denies nausea, accu-checks AC/HS. Mag & K+ replaced per protocol. Up ad tata. IV abx continued. Plan for CT of abd/pel tomorrow AM. Updated on care of plan. Fall precautions in place. Call light within reach. Calls appropriately. Manage/alleviate anxiety  - Utilize distraction and/or relaxation techniques  - Monitor for opioid side effects  - Notify MD/LIP if interventions unsuccessful or patient reports new pain  - Anticipate increased pain with activity and pre-medicate as approp function  - Maintain adequate hydration with IV or PO as ordered and tolerated  - Evaluate effectiveness of GI medications  - Encourage mobilization and activity  - Obtain nutritional consult as needed  - Establish a toileting routine/schedule  - Consider

## 2020-09-03 ENCOUNTER — APPOINTMENT (OUTPATIENT)
Dept: PICC SERVICES | Facility: HOSPITAL | Age: 55
End: 2020-09-03
Attending: HOSPITALIST
Payer: COMMERCIAL

## 2020-09-03 ENCOUNTER — APPOINTMENT (OUTPATIENT)
Dept: CT IMAGING | Facility: HOSPITAL | Age: 55
End: 2020-09-03
Attending: SURGERY
Payer: COMMERCIAL

## 2020-09-03 VITALS
TEMPERATURE: 99 F | WEIGHT: 315 LBS | DIASTOLIC BLOOD PRESSURE: 93 MMHG | HEART RATE: 92 BPM | SYSTOLIC BLOOD PRESSURE: 157 MMHG | RESPIRATION RATE: 20 BRPM | BODY MASS INDEX: 44 KG/M2 | OXYGEN SATURATION: 96 %

## 2020-09-03 PROBLEM — K37 APPENDICITIS: Status: ACTIVE | Noted: 2020-09-03

## 2020-09-03 LAB
ANION GAP SERPL CALC-SCNC: 6 MMOL/L (ref 0–18)
BASOPHILS # BLD AUTO: 0.03 X10(3) UL (ref 0–0.2)
BASOPHILS NFR BLD AUTO: 0.4 %
BUN BLD-MCNC: 14 MG/DL (ref 7–18)
BUN/CREAT SERPL: 10 (ref 10–20)
CALCIUM BLD-MCNC: 8.9 MG/DL (ref 8.5–10.1)
CHLORIDE SERPL-SCNC: 107 MMOL/L (ref 98–112)
CO2 SERPL-SCNC: 26 MMOL/L (ref 21–32)
CREAT BLD-MCNC: 1.4 MG/DL (ref 0.7–1.3)
DEPRECATED RDW RBC AUTO: 40.8 FL (ref 35.1–46.3)
EOSINOPHIL # BLD AUTO: 0.21 X10(3) UL (ref 0–0.7)
EOSINOPHIL NFR BLD AUTO: 3.1 %
ERYTHROCYTE [DISTWIDTH] IN BLOOD BY AUTOMATED COUNT: 12.6 % (ref 11–15)
GLUCOSE BLD-MCNC: 140 MG/DL (ref 70–99)
GLUCOSE BLDC GLUCOMTR-MCNC: 136 MG/DL (ref 70–99)
GLUCOSE BLDC GLUCOMTR-MCNC: 138 MG/DL (ref 70–99)
GLUCOSE BLDC GLUCOMTR-MCNC: 148 MG/DL (ref 70–99)
HAV IGM SER QL: 1.5 MG/DL (ref 1.6–2.6)
HCT VFR BLD AUTO: 40 % (ref 39–53)
HGB BLD-MCNC: 13.4 G/DL (ref 13–17.5)
IMM GRANULOCYTES # BLD AUTO: 0.03 X10(3) UL (ref 0–1)
IMM GRANULOCYTES NFR BLD: 0.4 %
INR BLD: 1.27 (ref 0.9–1.2)
LYMPHOCYTES # BLD AUTO: 1.11 X10(3) UL (ref 1–4)
LYMPHOCYTES NFR BLD AUTO: 16.2 %
MCH RBC QN AUTO: 30 PG (ref 26–34)
MCHC RBC AUTO-ENTMCNC: 33.5 G/DL (ref 31–37)
MCV RBC AUTO: 89.5 FL (ref 80–100)
MONOCYTES # BLD AUTO: 0.88 X10(3) UL (ref 0.1–1)
MONOCYTES NFR BLD AUTO: 12.8 %
NEUTROPHILS # BLD AUTO: 4.61 X10 (3) UL (ref 1.5–7.7)
NEUTROPHILS # BLD AUTO: 4.61 X10(3) UL (ref 1.5–7.7)
NEUTROPHILS NFR BLD AUTO: 67.1 %
OSMOLALITY SERPL CALC.SUM OF ELEC: 291 MOSM/KG (ref 275–295)
PHOSPHATE SERPL-MCNC: 2.6 MG/DL (ref 2.5–4.9)
PLATELET # BLD AUTO: 201 10(3)UL (ref 150–450)
POTASSIUM SERPL-SCNC: 4 MMOL/L (ref 3.5–5.1)
PROTHROMBIN TIME: 15.7 SECONDS (ref 11.8–14.5)
RBC # BLD AUTO: 4.47 X10(6)UL (ref 4.3–5.7)
SODIUM SERPL-SCNC: 139 MMOL/L (ref 136–145)
WBC # BLD AUTO: 6.9 X10(3) UL (ref 4–11)

## 2020-09-03 PROCEDURE — 80048 BASIC METABOLIC PNL TOTAL CA: CPT | Performed by: HOSPITALIST

## 2020-09-03 PROCEDURE — 85610 PROTHROMBIN TIME: CPT | Performed by: SURGERY

## 2020-09-03 PROCEDURE — 76937 US GUIDE VASCULAR ACCESS: CPT

## 2020-09-03 PROCEDURE — 82962 GLUCOSE BLOOD TEST: CPT

## 2020-09-03 PROCEDURE — 85025 COMPLETE CBC W/AUTO DIFF WBC: CPT | Performed by: HOSPITALIST

## 2020-09-03 PROCEDURE — 36410 VNPNXR 3YR/> PHY/QHP DX/THER: CPT

## 2020-09-03 PROCEDURE — 83735 ASSAY OF MAGNESIUM: CPT | Performed by: SURGERY

## 2020-09-03 PROCEDURE — 74177 CT ABD & PELVIS W/CONTRAST: CPT | Performed by: SURGERY

## 2020-09-03 PROCEDURE — 84100 ASSAY OF PHOSPHORUS: CPT | Performed by: SURGERY

## 2020-09-03 RX ORDER — FUROSEMIDE 40 MG/1
40 TABLET ORAL DAILY
Status: DISCONTINUED | OUTPATIENT
Start: 2020-09-03 | End: 2020-09-03

## 2020-09-03 RX ORDER — METOPROLOL TARTRATE 100 MG/1
100 TABLET ORAL
Status: DISCONTINUED | OUTPATIENT
Start: 2020-09-03 | End: 2020-09-03

## 2020-09-03 RX ORDER — HYDROCHLOROTHIAZIDE 25 MG/1
6.25 TABLET ORAL DAILY
Status: DISCONTINUED | OUTPATIENT
Start: 2020-09-03 | End: 2020-09-03

## 2020-09-03 RX ORDER — MAGNESIUM OXIDE 400 MG (241.3 MG MAGNESIUM) TABLET
800 TABLET ONCE
Status: COMPLETED | OUTPATIENT
Start: 2020-09-03 | End: 2020-09-03

## 2020-09-03 RX ORDER — POLYETHYLENE GLYCOL 3350 17 G/17G
17 POWDER, FOR SOLUTION ORAL DAILY
Status: DISCONTINUED | OUTPATIENT
Start: 2020-09-03 | End: 2020-09-03

## 2020-09-03 RX ORDER — BISOPROLOL FUMARATE AND HYDROCHLOROTHIAZIDE 6.25; 1 MG/1; MG/1
1 TABLET ORAL DAILY
Status: DISCONTINUED | OUTPATIENT
Start: 2020-09-03 | End: 2020-09-03

## 2020-09-03 RX ORDER — DOCUSATE SODIUM 100 MG/1
100 CAPSULE, LIQUID FILLED ORAL 2 TIMES DAILY
Status: DISCONTINUED | OUTPATIENT
Start: 2020-09-03 | End: 2020-09-03

## 2020-09-03 RX ORDER — DILTIAZEM HYDROCHLORIDE 240 MG/1
240 CAPSULE, COATED, EXTENDED RELEASE ORAL 2 TIMES DAILY
Status: DISCONTINUED | OUTPATIENT
Start: 2020-09-03 | End: 2020-09-03

## 2020-09-03 RX ORDER — POLYETHYLENE GLYCOL 3350 17 G/17G
17 POWDER, FOR SOLUTION ORAL DAILY PRN
Status: DISCONTINUED | OUTPATIENT
Start: 2020-09-03 | End: 2020-09-03

## 2020-09-03 NOTE — PLAN OF CARE
Problem: Patient Centered Care  Goal: Patient preferences are identified and integrated in the patient's plan of care  Description  Interventions:  - What would you like us to know as we care for you?  My mother-in-law is in the hospital  - Provide timely Discharge     Problem: SAFETY ADULT - FALL  Goal: Free from fall injury  Description  INTERVENTIONS:  - Assess pt frequently for physical needs  - Identify cognitive and physical deficits and behaviors that affect risk of falls.   - Doland fall precautio Consider collaborating with pharmacy to review patient's medication profile  Outcome: Adequate for Discharge     Patient is alert and oriented x4. He is tolerating a carb controlled low fiber diet, exhibiting a good appetite.  Patient stating pain as 2/10,

## 2020-09-03 NOTE — CM/SW NOTE
9/3 237pm: The pt. Has been set up with [facility] infusion at the [facility] location. The pt.  Is scheduled for tomorrow 9/4 at 245pm and Saturday 9/5 at 1115am.  Appointment times and location have been entered on the pt's AVS.   -----------------------  9/3 124

## 2020-09-03 NOTE — PROGRESS NOTES
[facility]    General Surgery Progress Note  Alyssa Gage  TAU:[id number]  HD# 0       Subjective:   Feels better, denies RLQ abdominal pain and denies N/V  Passing flatus and loose stools      Exam:     General: awake and alert, in no ac possible need to convert to open surgery, etc. The patient and his wife consented to the treatment plan. Addendum:  CT scan showed residual inflammation but no abscess.   PICC and Invanz for 10 more days  F/u with me in 1-2 weeks      Objective:      09/

## 2020-09-03 NOTE — PLAN OF CARE
Patient denies pain and nausea. Ambulating independent. Per patient voiding every few hours. Loose stool in evening per patient,  Tolerating soft diet, but NPO since midnight for CT scan. Asleep through most of night with CPap in place.     Problem: Risa Bark techniques  - Monitor for opioid side effects  - Notify MD/LIP if interventions unsuccessful or patient reports new pain  - Anticipate increased pain with activity and pre-medicate as appropriate  Outcome: Progressing     Problem: SAFETY ADULT - FALL  Goal tolerated  - Evaluate effectiveness of GI medications  - Encourage mobilization and activity  - Obtain nutritional consult as needed  - Establish a toileting routine/schedule  - Consider collaborating with pharmacy to review patient's medication profile  O

## 2020-09-03 NOTE — PROGRESS NOTES
Therapeutic interchange from [address] [place] [address] [place] 10mg/6.25mg daily to metoprolol tartrate 100 mg twice daily and hydrochlorothiazide 6.25 mg daily per P&T approved protocol.      Thank you,  Kat Navarro, PharmD

## 2020-09-03 NOTE — PROGRESS NOTES
[facility] Hospitalist Progress Note     CC: Hospital Follow up    PCP: Carline Fletcher DO       Assessment/Plan:     Active Problems:    Acute appendicitis    Appendicitis    Mr. Sandor Danielle is a 60year old male with PMH sig for DM c/b nephropathy, HTN, HLD, persistent patient while in house     Patient and/or patient's family given opportunity to ask questions and note understanding and agreeing with therapeutic plan as outlined     Otto Paniagua MD  [facility] Hospitalist  Answering Service number: [phone]        Sub --  152         Imaging:  Ct Abdomen+pelvis(contrast Only)(cpt=74177)    Result Date: 9/3/2020  CONCLUSION:  1. Appendicitis with right lower quadrant inflammatory changes including trace fluid, but no drainable fluid collection or significant change.  2.

## 2020-09-04 NOTE — DISCHARGE SUMMARY
General Medicine Discharge Summary     Patient ID:  Hernandez Marrufo  60year old  1/26/1965    Admit date: 8/30/2020    Discharge date and time: 09/03/20    Attending Physician: No att. providers found     Primary Care Physician: DO Tangela Purvis - repeat CT without abscess, still with inflammation  - d/w surgery, plan for PICC/midline and 10 days of IV invanz, script in chart, d/w SW       Hyponatremia   - hold diuretics  - gentle IVF     Afib on warfarin / coagulopathy   - tele  - holding raul CONCLUSION:  1. Appendicitis with right lower quadrant inflammatory changes including trace fluid, but no drainable fluid collection or significant change. 2. Colonic diverticulosis. Large amount of stool in the colon.  3. Mild nonspecific retroperitoneal Take 1 tablet (2 mg total) by mouth once daily. * Glucose Blood Strp  Commonly known as:  OneTouch Ultra Blue  daily testing     * Glucose Blood Strp  Test twice a day     lisinopril 10 MG Tabs  Take 1 tablet (10 mg total) by mouth daily.      magnesium FU  Follow-up Information     Siavelis, Ivie Jeans, MD In 1 week. Specialty:  SURGERY, GENERAL  Why:  follow-up in 1-2 weeks  Contact information:  [address] instructions:       Other

## 2020-09-15 PROBLEM — K35.30 ACUTE APPENDICITIS WITH LOCALIZED PERITONITIS, WITHOUT PERFORATION, ABSCESS, OR GANGRENE: Status: ACTIVE | Noted: 2020-08-30

## 2020-10-15 PROBLEM — K43.9 UNCOMPLICATED EPIGASTRIC HERNIA: Status: ACTIVE | Noted: 2020-10-15

## 2020-11-23 ENCOUNTER — APPOINTMENT (OUTPATIENT)
Dept: LAB | Age: 55
End: 2020-11-23
Attending: SURGERY
Payer: COMMERCIAL

## 2020-11-23 DIAGNOSIS — Z01.818 PREOP TESTING: ICD-10-CM

## 2020-11-25 ENCOUNTER — HOSPITAL ENCOUNTER (OUTPATIENT)
Facility: HOSPITAL | Age: 55
Setting detail: HOSPITAL OUTPATIENT SURGERY
Discharge: HOME | End: 2020-11-25
Attending: SURGERY | Admitting: SURGERY
Payer: COMMERCIAL

## 2020-11-25 ENCOUNTER — ANESTHESIA (OUTPATIENT)
Dept: SURGERY | Facility: HOSPITAL | Age: 55
End: 2020-11-25
Payer: COMMERCIAL

## 2020-11-25 ENCOUNTER — ANESTHESIA EVENT (OUTPATIENT)
Dept: SURGERY | Facility: HOSPITAL | Age: 55
End: 2020-11-25
Payer: COMMERCIAL

## 2020-11-25 VITALS
OXYGEN SATURATION: 94 % | HEART RATE: 84 BPM | HEIGHT: 72 IN | RESPIRATION RATE: 13 BRPM | BODY MASS INDEX: 42.66 KG/M2 | TEMPERATURE: 98 F | WEIGHT: 315 LBS | SYSTOLIC BLOOD PRESSURE: 130 MMHG | DIASTOLIC BLOOD PRESSURE: 77 MMHG

## 2020-11-25 DIAGNOSIS — Z01.818 PREOP TESTING: Primary | ICD-10-CM

## 2020-11-25 DIAGNOSIS — K35.30 ACUTE APPENDICITIS WITH LOCALIZED PERITONITIS, WITHOUT PERFORATION, ABSCESS, OR GANGRENE: ICD-10-CM

## 2020-11-25 PROCEDURE — 36415 COLL VENOUS BLD VENIPUNCTURE: CPT | Performed by: SURGERY

## 2020-11-25 PROCEDURE — 82962 GLUCOSE BLOOD TEST: CPT

## 2020-11-25 PROCEDURE — 0DTJ4ZZ RESECTION OF APPENDIX, PERCUTANEOUS ENDOSCOPIC APPROACH: ICD-10-PCS | Performed by: SURGERY

## 2020-11-25 PROCEDURE — 88304 TISSUE EXAM BY PATHOLOGIST: CPT | Performed by: SURGERY

## 2020-11-25 PROCEDURE — 85610 PROTHROMBIN TIME: CPT | Performed by: SURGERY

## 2020-11-25 RX ORDER — FAMOTIDINE 20 MG/1
20 TABLET ORAL ONCE
Status: COMPLETED | OUTPATIENT
Start: 2020-11-25 | End: 2020-11-25

## 2020-11-25 RX ORDER — DEXTROSE MONOHYDRATE 25 G/50ML
50 INJECTION, SOLUTION INTRAVENOUS
Status: DISCONTINUED | OUTPATIENT
Start: 2020-11-25 | End: 2020-11-25

## 2020-11-25 RX ORDER — HYDROCODONE BITARTRATE AND ACETAMINOPHEN 5; 325 MG/1; MG/1
1 TABLET ORAL AS NEEDED
Status: COMPLETED | OUTPATIENT
Start: 2020-11-25 | End: 2020-11-25

## 2020-11-25 RX ORDER — MORPHINE SULFATE 4 MG/ML
2 INJECTION, SOLUTION INTRAMUSCULAR; INTRAVENOUS EVERY 10 MIN PRN
Status: DISCONTINUED | OUTPATIENT
Start: 2020-11-25 | End: 2020-11-25

## 2020-11-25 RX ORDER — ONDANSETRON 2 MG/ML
4 INJECTION INTRAMUSCULAR; INTRAVENOUS ONCE AS NEEDED
Status: DISCONTINUED | OUTPATIENT
Start: 2020-11-25 | End: 2020-11-25

## 2020-11-25 RX ORDER — MORPHINE SULFATE 10 MG/ML
6 INJECTION, SOLUTION INTRAMUSCULAR; INTRAVENOUS EVERY 10 MIN PRN
Status: DISCONTINUED | OUTPATIENT
Start: 2020-11-25 | End: 2020-11-25

## 2020-11-25 RX ORDER — HYDROCODONE BITARTRATE AND ACETAMINOPHEN 5; 325 MG/1; MG/1
2 TABLET ORAL AS NEEDED
Status: COMPLETED | OUTPATIENT
Start: 2020-11-25 | End: 2020-11-25

## 2020-11-25 RX ORDER — HYDROCODONE BITARTRATE AND ACETAMINOPHEN 5; 325 MG/1; MG/1
1 TABLET ORAL EVERY 6 HOURS PRN
Qty: 15 TABLET | Refills: 0 | Status: SHIPPED | OUTPATIENT
Start: 2020-11-25 | End: 2020-12-10

## 2020-11-25 RX ORDER — MIDAZOLAM HYDROCHLORIDE 1 MG/ML
INJECTION INTRAMUSCULAR; INTRAVENOUS AS NEEDED
Status: DISCONTINUED | OUTPATIENT
Start: 2020-11-25 | End: 2020-11-25 | Stop reason: SURG

## 2020-11-25 RX ORDER — HYDROMORPHONE HYDROCHLORIDE 1 MG/ML
0.2 INJECTION, SOLUTION INTRAMUSCULAR; INTRAVENOUS; SUBCUTANEOUS EVERY 5 MIN PRN
Status: DISCONTINUED | OUTPATIENT
Start: 2020-11-25 | End: 2020-11-25

## 2020-11-25 RX ORDER — MORPHINE SULFATE 4 MG/ML
4 INJECTION, SOLUTION INTRAMUSCULAR; INTRAVENOUS EVERY 10 MIN PRN
Status: DISCONTINUED | OUTPATIENT
Start: 2020-11-25 | End: 2020-11-25

## 2020-11-25 RX ORDER — NALOXONE HYDROCHLORIDE 0.4 MG/ML
80 INJECTION, SOLUTION INTRAMUSCULAR; INTRAVENOUS; SUBCUTANEOUS AS NEEDED
Status: DISCONTINUED | OUTPATIENT
Start: 2020-11-25 | End: 2020-11-25

## 2020-11-25 RX ORDER — DEXAMETHASONE SODIUM PHOSPHATE 4 MG/ML
VIAL (ML) INJECTION AS NEEDED
Status: DISCONTINUED | OUTPATIENT
Start: 2020-11-25 | End: 2020-11-25 | Stop reason: SURG

## 2020-11-25 RX ORDER — ROCURONIUM BROMIDE 10 MG/ML
INJECTION, SOLUTION INTRAVENOUS AS NEEDED
Status: DISCONTINUED | OUTPATIENT
Start: 2020-11-25 | End: 2020-11-25 | Stop reason: SURG

## 2020-11-25 RX ORDER — HYDROMORPHONE HYDROCHLORIDE 1 MG/ML
0.4 INJECTION, SOLUTION INTRAMUSCULAR; INTRAVENOUS; SUBCUTANEOUS EVERY 5 MIN PRN
Status: DISCONTINUED | OUTPATIENT
Start: 2020-11-25 | End: 2020-11-25

## 2020-11-25 RX ORDER — GLYCOPYRROLATE 0.2 MG/ML
INJECTION, SOLUTION INTRAMUSCULAR; INTRAVENOUS AS NEEDED
Status: DISCONTINUED | OUTPATIENT
Start: 2020-11-25 | End: 2020-11-25 | Stop reason: SURG

## 2020-11-25 RX ORDER — HYDROMORPHONE HYDROCHLORIDE 1 MG/ML
0.6 INJECTION, SOLUTION INTRAMUSCULAR; INTRAVENOUS; SUBCUTANEOUS EVERY 5 MIN PRN
Status: DISCONTINUED | OUTPATIENT
Start: 2020-11-25 | End: 2020-11-25

## 2020-11-25 RX ORDER — KETAMINE HYDROCHLORIDE 50 MG/ML
INJECTION, SOLUTION, CONCENTRATE INTRAMUSCULAR; INTRAVENOUS AS NEEDED
Status: DISCONTINUED | OUTPATIENT
Start: 2020-11-25 | End: 2020-11-25 | Stop reason: SURG

## 2020-11-25 RX ORDER — ONDANSETRON 2 MG/ML
INJECTION INTRAMUSCULAR; INTRAVENOUS AS NEEDED
Status: DISCONTINUED | OUTPATIENT
Start: 2020-11-25 | End: 2020-11-25 | Stop reason: SURG

## 2020-11-25 RX ORDER — ACETAMINOPHEN 500 MG
1000 TABLET ORAL ONCE
Status: COMPLETED | OUTPATIENT
Start: 2020-11-25 | End: 2020-11-25

## 2020-11-25 RX ORDER — LABETALOL HYDROCHLORIDE 5 MG/ML
INJECTION, SOLUTION INTRAVENOUS AS NEEDED
Status: DISCONTINUED | OUTPATIENT
Start: 2020-11-25 | End: 2020-11-25 | Stop reason: SURG

## 2020-11-25 RX ORDER — PROCHLORPERAZINE EDISYLATE 5 MG/ML
5 INJECTION INTRAMUSCULAR; INTRAVENOUS ONCE AS NEEDED
Status: DISCONTINUED | OUTPATIENT
Start: 2020-11-25 | End: 2020-11-25

## 2020-11-25 RX ORDER — HYDROCODONE BITARTRATE AND ACETAMINOPHEN 5; 325 MG/1; MG/1
1 TABLET ORAL EVERY 6 HOURS PRN
Qty: 15 TABLET | Refills: 0 | Status: SHIPPED | OUTPATIENT
Start: 2020-11-25 | End: 2020-11-25

## 2020-11-25 RX ORDER — SODIUM CHLORIDE, SODIUM LACTATE, POTASSIUM CHLORIDE, CALCIUM CHLORIDE 600; 310; 30; 20 MG/100ML; MG/100ML; MG/100ML; MG/100ML
INJECTION, SOLUTION INTRAVENOUS CONTINUOUS
Status: DISCONTINUED | OUTPATIENT
Start: 2020-11-25 | End: 2020-11-25

## 2020-11-25 RX ORDER — HALOPERIDOL 5 MG/ML
0.25 INJECTION INTRAMUSCULAR ONCE AS NEEDED
Status: DISCONTINUED | OUTPATIENT
Start: 2020-11-25 | End: 2020-11-25

## 2020-11-25 RX ORDER — LIDOCAINE HYDROCHLORIDE 10 MG/ML
INJECTION, SOLUTION EPIDURAL; INFILTRATION; INTRACAUDAL; PERINEURAL AS NEEDED
Status: DISCONTINUED | OUTPATIENT
Start: 2020-11-25 | End: 2020-11-25 | Stop reason: SURG

## 2020-11-25 RX ORDER — METOCLOPRAMIDE 10 MG/1
10 TABLET ORAL ONCE
Status: COMPLETED | OUTPATIENT
Start: 2020-11-25 | End: 2020-11-25

## 2020-11-25 RX ADMIN — ROCURONIUM BROMIDE 15 MG: 10 INJECTION, SOLUTION INTRAVENOUS at 08:11:00

## 2020-11-25 RX ADMIN — LABETALOL HYDROCHLORIDE 5 MG: 5 INJECTION, SOLUTION INTRAVENOUS at 08:36:00

## 2020-11-25 RX ADMIN — ROCURONIUM BROMIDE 10 MG: 10 INJECTION, SOLUTION INTRAVENOUS at 08:21:00

## 2020-11-25 RX ADMIN — MIDAZOLAM HYDROCHLORIDE 2 MG: 1 INJECTION INTRAMUSCULAR; INTRAVENOUS at 07:51:00

## 2020-11-25 RX ADMIN — ONDANSETRON 4 MG: 2 INJECTION INTRAMUSCULAR; INTRAVENOUS at 08:17:00

## 2020-11-25 RX ADMIN — GLYCOPYRROLATE 0.2 MG: 0.2 INJECTION, SOLUTION INTRAMUSCULAR; INTRAVENOUS at 08:17:00

## 2020-11-25 RX ADMIN — ROCURONIUM BROMIDE 10 MG: 10 INJECTION, SOLUTION INTRAVENOUS at 08:37:00

## 2020-11-25 RX ADMIN — LIDOCAINE HYDROCHLORIDE 50 MG: 10 INJECTION, SOLUTION EPIDURAL; INFILTRATION; INTRACAUDAL; PERINEURAL at 07:53:00

## 2020-11-25 RX ADMIN — ROCURONIUM BROMIDE 20 MG: 10 INJECTION, SOLUTION INTRAVENOUS at 08:03:00

## 2020-11-25 RX ADMIN — SODIUM CHLORIDE, SODIUM LACTATE, POTASSIUM CHLORIDE, CALCIUM CHLORIDE: 600; 310; 30; 20 INJECTION, SOLUTION INTRAVENOUS at 07:51:00

## 2020-11-25 RX ADMIN — SODIUM CHLORIDE, SODIUM LACTATE, POTASSIUM CHLORIDE, CALCIUM CHLORIDE: 600; 310; 30; 20 INJECTION, SOLUTION INTRAVENOUS at 08:53:00

## 2020-11-25 RX ADMIN — SODIUM CHLORIDE, SODIUM LACTATE, POTASSIUM CHLORIDE, CALCIUM CHLORIDE: 600; 310; 30; 20 INJECTION, SOLUTION INTRAVENOUS at 08:21:00

## 2020-11-25 RX ADMIN — KETAMINE HYDROCHLORIDE 50 MG: 50 INJECTION, SOLUTION, CONCENTRATE INTRAMUSCULAR; INTRAVENOUS at 08:17:00

## 2020-11-25 RX ADMIN — DEXAMETHASONE SODIUM PHOSPHATE 4 MG: 4 MG/ML VIAL (ML) INJECTION at 08:17:00

## 2020-11-25 NOTE — ANESTHESIA PREPROCEDURE EVALUATION
Anesthesia PreOp Note    HPI:     Ignacio Grant is a 60year old male who presents for preoperative consultation requested by: Virginie Antony MD    Date of Surgery: 11/25/2020    Procedure(s):  LAPAROSCOPIC APPENDECTOMY  HERNIA UMBILICAL REPAIR ADULT myelopathy         Date Noted: 09/28/2015      Knee effusion, right         Date Noted: 09/11/2015      Status post arthroscopy of right knee         Date Noted: 09/09/2015      SX/ROB/  /SCSC/ RT KNEE SCOPE / DOS 9-4-15 / EXP 12-3-15         D tablet, Rfl: 3, 11/24/2020 at 0900    •  Cholecalciferol (VITAMIN D) 50 MCG (2000 UT) Oral Tab, Take by mouth daily. , Disp: , Rfl: , Past Week at Unknown time    •  furosemide 40 MG Oral Tab, Take 1 tablet (40 mg total) by mouth daily.  (Patient taking diff day, Disp: 200 each, Rfl: 3    •  Glucose Blood (ONETOUCH ULTRA BLUE) In Vitro Strip, daily testing, Disp: 100 strip, Rfl: 11    •  OneTouch UltraSoft Lancets Does not apply Misc, daily testing, Disp: 100 each, Rfl: 11        •  lactated ringers infusion, Minutes per session: Not on file      Stress: Not on file    Relationships      Social connections        Talks on phone: Not on file        Gets together: Not on file        Attends Hinduism service: Not on file        Active member of club or organi 55   Resp:  19   Temp:  98.3 °F (36.8 °C)   TempSrc:  Oral   SpO2:  95%   Weight: (!) 145.2 kg (320 lb) (!) 146.5 kg (323 lb)   Height: 1.829 m (6')         Anesthesia Evaluation     Patient summary reviewed and Nursing notes reviewed    No history of ane

## 2020-11-25 NOTE — H&P
Mikel Phillips is a 60year old male. Patient presents with: Follow - Up: Follow up patsy done with IV antibiotics     HPI:       Patient is here for a follow-up visit.   Patient underwent inpatient treatment for appendicitis on 8/30/2020 with antibiotics • furosemide 40 MG Oral Tab Take 1 tablet (40 mg total) by mouth daily. (Patient taking differently: Take 40 mg by mouth 2 (two) times daily.  ) 90 tablet 0   • lisinopril 10 MG Oral Tab Take 1 tablet (10 mg total) by mouth daily.  90 tablet 0   • Potassium • Type II or unspecified type diabetes mellitus without mention of complication, not stated as uncontrolled     • Unspecified essential hypertension     • Unspecified sleep apnea               Past Surgical History:   Procedure Laterality Date   • [address]    The patient has h/o acute appendicitis. He also has a long-standing reducible asymptomatic umbilical hernia which we will repair at the same time. There also is an left epigastric ventral hernia that was also seen on CT scan.  The patient understood that

## 2020-11-25 NOTE — BRIEF OP NOTE
[facility] POST ANESTHESIA CARE UNIT  Brief Op Note       Patients Name: Moisés Greenfield  Attending Physician: Suzie Reveles MD  Operating Physician: Suzie Reveles MD  CSN: [id number]     Location:  OR  MRN: [id number]    Date of Birth: [birth year]

## 2020-11-25 NOTE — ANESTHESIA PROCEDURE NOTES
Airway  Date/Time: 11/25/2020 7:58 AM  Urgency: elective    Airway not difficult    General Information and Staff    Patient location during procedure: OR  Anesthesiologist: Woody Hopkins MD  Resident/CRNA: Christina Alexis CRNA  Performed: Jocelyne Mejia

## 2020-11-25 NOTE — OPERATIVE REPORT
OPERATIVE REPORT:     PATIENT NAME: Nicolasa Davalos  : 1965   CSN: [id number]    DATE OF OPERATION:   20    PREOPERATIVE DIAGNOSIS: h/o Acute appendicitis, umbilical hernia    POSTOPERATIVE DIAGNOSIS: same     PROCEDURE PERFORMED: Laparoscopic then the umbilical fascial defect was closed using continuous 0 PDS suture and simple interrupted 0 Vicryl sutures. Local anesthetic was infiltrated at the fascial level and the wounds were irrigated and closed using 4-0 Vicryl subicular sutures.   The inc

## 2020-11-25 NOTE — DISCHARGE SUMMARY
Outpatient Surgery Brief Discharge Summary         Patient ID:  Liza Neves  [id number]  60year old  1/26/1965    Discharge Diagnoses: Acute appendicitis with localized peritonitis, without perforation, abscess, or gangrene [G34.58]; umbilical hernia Place ice pack to operative site 3 times a day for the first 48 hours. You may shower tomorrow with the operative site away from the shower head. The tape bandage may be removed in 3 days and leave the [employer] \"butterfly\" tapes intact.  You may cont To feel muscle aches or soreness especially in the abdomen, chest or neck. The achy feeling should go away in the next 24 hours   To feel weak, sleepy or \"wiped out\". Your should start feeling better in the next 24 hours.    To experience mild discomforts furosemide 40 MG Tabs  Commonly known as: LASIX  What changed: when to take this      Take 1 tablet (40 mg total) by mouth daily.    Quantity: 90 tablet  Refills: 0        CONTINUE taking these medications      Instructions Prescription details   allopurino Take 1 tablet (20 mEq total) by mouth daily. Quantity: 90 tablet  Refills: 3     Potassium Chloride ER 20 MEQ Tbcr      TAKE 1 TABLET BY MOUTH EVERY DAY   Quantity: 90 tablet  Refills: 3     Vitamin D 50 MCG (2000 UT) Tabs      Take by mouth daily.

## 2020-11-25 NOTE — ANESTHESIA POSTPROCEDURE EVALUATION
Patient: Beth Pereyra    Procedure Summary     Date: 11/25/20 Room / Location: [facility] OR 05 / [facility] OR    Anesthesia Start: 4022 Anesthesia Stop:     Procedures:       LAPAROSCOPIC APPENDECTOMY (N/A Abdomen)      HERNIA UMBILICAL REPAIR ADULT (N/A

## 2021-09-07 PROBLEM — E11.621 ULCER OF RIGHT GREAT TOE DUE TO DIABETES MELLITUS (HCC): Status: RESOLVED | Noted: 2017-06-23 | Resolved: 2021-09-07

## 2021-09-07 PROBLEM — L97.519 ULCER OF RIGHT GREAT TOE DUE TO DIABETES MELLITUS (HCC): Status: RESOLVED | Noted: 2017-06-23 | Resolved: 2021-09-07

## 2021-09-17 PROBLEM — M54.50 CHRONIC BILATERAL LOW BACK PAIN WITHOUT SCIATICA: Status: ACTIVE | Noted: 2021-09-17

## 2021-09-17 PROBLEM — G89.29 CHRONIC BILATERAL LOW BACK PAIN WITHOUT SCIATICA: Status: ACTIVE | Noted: 2021-09-17

## 2021-09-17 PROBLEM — M48.062 NEUROGENIC CLAUDICATION DUE TO LUMBAR SPINAL STENOSIS: Status: ACTIVE | Noted: 2021-09-17

## 2022-03-25 NOTE — PROCEDURES
Procedure Report    Indication for procedure: abnormal stress test, dyspnea on exertion    Procedures performed:  1) Left heart catheterization  2) Coronary angiography   3) Supervision of moderate sedation from 0902 to 0943, with a total of 5mg versed and
Reason?: Additional Information
Reason?: non-covered service
Detail Level: Detailed
Payment Option: Option 1: Bill Medicare, await for decision on payment.

## 2022-07-10 ENCOUNTER — HOSPITAL ENCOUNTER (OUTPATIENT)
Age: 57
Discharge: HOME | End: 2022-07-10
Payer: COMMERCIAL

## 2022-07-10 VITALS
RESPIRATION RATE: 18 BRPM | HEART RATE: 62 BPM | OXYGEN SATURATION: 96 % | DIASTOLIC BLOOD PRESSURE: 77 MMHG | SYSTOLIC BLOOD PRESSURE: 169 MMHG | TEMPERATURE: 97 F

## 2022-07-10 DIAGNOSIS — S80.812A ABRASION OF LEFT LOWER EXTREMITY, INITIAL ENCOUNTER: Primary | ICD-10-CM

## 2022-07-10 DIAGNOSIS — H61.22 IMPACTED CERUMEN OF LEFT EAR: ICD-10-CM

## 2022-07-10 PROCEDURE — 69209 REMOVE IMPACTED EAR WAX UNI: CPT

## 2022-07-10 PROCEDURE — 99203 OFFICE O/P NEW LOW 30 MIN: CPT

## 2022-07-10 RX ORDER — CEPHALEXIN 500 MG/1
500 CAPSULE ORAL 4 TIMES DAILY
Qty: 28 CAPSULE | Refills: 0 | Status: SHIPPED | OUTPATIENT
Start: 2022-07-10 | End: 2022-07-17

## 2022-07-10 NOTE — ED INITIAL ASSESSMENT (HPI)
Abrasion to left lower leg after being hit by water pressure machine yesterday, pt h/o dm on coumadin, also states he sprained left ankle earlier prior to incident, + swelling, h/o neuropathy, + pedal pulse,had 2 doses of left over amoxicillin

## 2022-07-16 ENCOUNTER — APPOINTMENT (OUTPATIENT)
Dept: GENERAL RADIOLOGY | Age: 57
End: 2022-07-16
Attending: EMERGENCY MEDICINE
Payer: COMMERCIAL

## 2022-07-16 ENCOUNTER — HOSPITAL ENCOUNTER (OUTPATIENT)
Age: 57
Discharge: HOME | End: 2022-07-16
Attending: EMERGENCY MEDICINE
Payer: COMMERCIAL

## 2022-07-16 ENCOUNTER — APPOINTMENT (OUTPATIENT)
Dept: ULTRASOUND IMAGING | Age: 57
End: 2022-07-16
Attending: EMERGENCY MEDICINE
Payer: COMMERCIAL

## 2022-07-16 VITALS
OXYGEN SATURATION: 96 % | DIASTOLIC BLOOD PRESSURE: 71 MMHG | TEMPERATURE: 97 F | HEART RATE: 72 BPM | RESPIRATION RATE: 20 BRPM | SYSTOLIC BLOOD PRESSURE: 151 MMHG

## 2022-07-16 DIAGNOSIS — L03.116 CELLULITIS OF LEFT LOWER LEG: Primary | ICD-10-CM

## 2022-07-16 LAB
#MXD IC: 0.8 X10ˆ3/UL (ref 0.1–1)
BUN BLD-MCNC: 40 MG/DL (ref 7–18)
CHLORIDE BLD-SCNC: 99 MMOL/L (ref 98–112)
CO2 BLD-SCNC: 30 MMOL/L (ref 21–32)
CREAT BLD-MCNC: 1.4 MG/DL
GLUCOSE BLD-MCNC: 205 MG/DL (ref 70–99)
HCT VFR BLD AUTO: 47.9 %
HCT VFR BLD CALC: 50 %
HGB BLD-MCNC: 16.1 G/DL
INR BLDC: 2.3 (ref 0.9–1.1)
ISTAT IONIZED CALCIUM FOR CHEM 8: 1.21 MMOL/L (ref 1.12–1.32)
LYMPHOCYTES # BLD AUTO: 2.1 X10ˆ3/UL (ref 1–4)
LYMPHOCYTES NFR BLD AUTO: 25.4 %
MCH RBC QN AUTO: 30.3 PG (ref 26–34)
MCHC RBC AUTO-ENTMCNC: 33.6 G/DL (ref 31–37)
MCV RBC AUTO: 90.2 FL (ref 80–100)
MIXED CELL %: 9 %
NEUTROPHILS # BLD AUTO: 5.5 X10ˆ3/UL (ref 1.5–7.7)
NEUTROPHILS NFR BLD AUTO: 65.6 %
PLATELET # BLD AUTO: 201 X10ˆ3/UL (ref 150–450)
POTASSIUM BLD-SCNC: 4.2 MMOL/L (ref 3.6–5.1)
RBC # BLD AUTO: 5.31 X10ˆ6/UL
SODIUM BLD-SCNC: 138 MMOL/L (ref 136–145)
WBC # BLD AUTO: 8.4 X10ˆ3/UL (ref 4–11)

## 2022-07-16 PROCEDURE — 85610 PROTHROMBIN TIME: CPT | Performed by: EMERGENCY MEDICINE

## 2022-07-16 PROCEDURE — 73630 X-RAY EXAM OF FOOT: CPT | Performed by: EMERGENCY MEDICINE

## 2022-07-16 PROCEDURE — 99214 OFFICE O/P EST MOD 30 MIN: CPT

## 2022-07-16 PROCEDURE — 85025 COMPLETE CBC W/AUTO DIFF WBC: CPT | Performed by: EMERGENCY MEDICINE

## 2022-07-16 PROCEDURE — 80047 BASIC METABLC PNL IONIZED CA: CPT

## 2022-07-16 PROCEDURE — 93971 EXTREMITY STUDY: CPT | Performed by: EMERGENCY MEDICINE

## 2022-07-16 PROCEDURE — 36415 COLL VENOUS BLD VENIPUNCTURE: CPT

## 2022-07-16 PROCEDURE — 73610 X-RAY EXAM OF ANKLE: CPT | Performed by: EMERGENCY MEDICINE

## 2022-07-16 NOTE — ED INITIAL ASSESSMENT (HPI)
Patient states he was seen in the ic on 7/10 evaluated for abrasion to left lower leg after being hit by a water pressure machine. Was started on keflex. Patient noted increased swelling to lle and stopped the keflex on Thursday 7/14. Denies fevers.

## 2022-07-29 ENCOUNTER — HOSPITAL ENCOUNTER (OUTPATIENT)
Age: 57
Discharge: HOME | End: 2022-07-29
Attending: EMERGENCY MEDICINE
Payer: COMMERCIAL

## 2022-07-29 VITALS
RESPIRATION RATE: 18 BRPM | DIASTOLIC BLOOD PRESSURE: 76 MMHG | WEIGHT: 315 LBS | HEIGHT: 72 IN | OXYGEN SATURATION: 95 % | TEMPERATURE: 98 F | BODY MASS INDEX: 42.66 KG/M2 | HEART RATE: 75 BPM | SYSTOLIC BLOOD PRESSURE: 131 MMHG

## 2022-07-29 DIAGNOSIS — U07.1 COVID: Primary | ICD-10-CM

## 2022-07-29 LAB — SARS-COV-2 RNA RESP QL NAA+PROBE: DETECTED

## 2022-07-29 PROCEDURE — 99214 OFFICE O/P EST MOD 30 MIN: CPT

## 2022-07-29 RX ORDER — CLINDAMYCIN HYDROCHLORIDE 300 MG/1
300 CAPSULE ORAL 3 TIMES DAILY
Qty: 21 CAPSULE | Refills: 0 | Status: SHIPPED | OUTPATIENT
Start: 2022-07-29 | End: 2022-08-05

## 2022-07-29 RX ORDER — BEBTELOVIMAB 87.5 MG/ML
175 INJECTION, SOLUTION INTRAVENOUS ONCE
Status: COMPLETED | OUTPATIENT
Start: 2022-07-29 | End: 2022-07-29

## 2022-07-29 NOTE — ED INITIAL ASSESSMENT (HPI)
Pt reports tested positive for covid at home this morning, fatigue since yesterday, headache this morning. Pt also wants to be reevaluated for skin problem to left ankle  he was seen for 2 weeks ago.

## 2023-01-12 ENCOUNTER — HOSPITAL ENCOUNTER (OUTPATIENT)
Age: 58
Discharge: HOME | End: 2023-01-12
Attending: EMERGENCY MEDICINE
Payer: COMMERCIAL

## 2023-01-12 VITALS
RESPIRATION RATE: 18 BRPM | OXYGEN SATURATION: 95 % | DIASTOLIC BLOOD PRESSURE: 83 MMHG | SYSTOLIC BLOOD PRESSURE: 146 MMHG | HEART RATE: 69 BPM | TEMPERATURE: 98 F

## 2023-01-12 DIAGNOSIS — T78.40XA ACUTE ALLERGIC REACTION, INITIAL ENCOUNTER: Primary | ICD-10-CM

## 2023-01-12 PROCEDURE — 99213 OFFICE O/P EST LOW 20 MIN: CPT

## 2023-01-12 RX ORDER — METHYLPREDNISOLONE 4 MG/1
TABLET ORAL
Qty: 1 EACH | Refills: 0 | Status: SHIPPED | OUTPATIENT
Start: 2023-01-12

## 2023-01-12 NOTE — ED INITIAL ASSESSMENT (HPI)
Per pt having 10 days of rash to back, no relieve with anti itch cream and benadryl. Denies any new products or foods, one month ago medication dosage changed.

## 2024-06-23 ENCOUNTER — HOSPITAL ENCOUNTER (OUTPATIENT)
Age: 59
Discharge: HOME | End: 2024-06-23
Attending: EMERGENCY MEDICINE

## 2024-06-23 VITALS
SYSTOLIC BLOOD PRESSURE: 154 MMHG | HEART RATE: 77 BPM | RESPIRATION RATE: 18 BRPM | DIASTOLIC BLOOD PRESSURE: 102 MMHG | TEMPERATURE: 97 F | OXYGEN SATURATION: 96 %

## 2024-06-23 DIAGNOSIS — T14.8XXA BLISTER: Primary | ICD-10-CM

## 2024-06-23 PROCEDURE — 10060 I&D ABSCESS SIMPLE/SINGLE: CPT

## 2024-06-23 PROCEDURE — 99213 OFFICE O/P EST LOW 20 MIN: CPT

## 2024-06-23 NOTE — ED INITIAL ASSESSMENT (HPI)
Patient arrives ambulatory with c/o right blister to right big toe x 2 days. Denies fevers. Hx DM.

## 2024-06-24 NOTE — ED PROVIDER NOTES
Patient Seen in: [facility]      History     Chief Complaint   Patient presents with    Blisters     Stated Complaint: right toe blister    Subjective:   HPI    60 yo male has  a chronic callus on his big toe. Was walking barefoot recently and now has a blister for the last two days. Had minimal drainage yesterday but today fluid filled again.     Objective:   Past Medical History:    Acute appendicitis with localized peritonitis, without perforation, abscess, or gangrene    Appendicitis    Arrhythmia    CKD stage 3 due to type 2 diabetes mellitus (HCC)    ROBBINS (dyspnea on exertion)    High blood pressure    High cholesterol    Hypercholesterolemia    Obese    Obesity, unspecified    Osteoarthritis    Other and unspecified hyperlipidemia    Type II or unspecified type diabetes mellitus without mention of complication, not stated as uncontrolled    Ulcer of right great toe due to diabetes mellitus (HCC)    Unspecified essential hypertension    Unspecified sleep apnea              Past Surgical History:   Procedure Laterality Date    Appendectomy      Knee scope,med&lat menis shav Right 9/4/2015    Procedure: ARTHROSCOPY KNEE RIGHT;  Surgeon: Andres Valencia MD;  Location: [facility]    Other surgical history      Sinus surgery        Tonsillectomy                  Social History     Socioeconomic History    Marital status:      Spouse name: Tatiana    Number of children: 0   Occupational History    Occupation: auto body repair/     Employer: [employer]   Tobacco Use    Smoking status: Former     Current packs/day: 0.00     Average packs/day: 1.5 packs/day for 25.0 years (37.5 ttl pk-yrs)     Types: Cigarettes     Start date: 10/21/1995     Quit date: 10/21/2020     Years since quitting: 3.6    Smokeless tobacco: Never    Tobacco comments:     quit 5 weeks   Vaping Use    Vaping status: Never Used   Substance and Sexual Activity    Alcohol use: Yes     Alcohol/week: 1.0 standard  drink of alcohol     Types: 1 Shots of liquor per week     Comment: social    Drug use: No   Other Topics Concern     Service No    Blood Transfusions No    Caffeine Concern No    Occupational Exposure No    Hobby Hazards No    Sleep Concern Yes    Stress Concern Yes    Weight Concern Yes    Special Diet Yes    Back Care Yes    Exercise No    Seat Belt Yes              Review of Systems    Positive for stated complaint: right toe blister  Other systems are as noted in HPI.  Constitutional and vital signs reviewed.      All other systems reviewed and negative except as noted above.    Physical Exam     ED Triage Vitals [06/23/24 1151]   BP (!) 154/102   Pulse 77   Resp 18   Temp 97.4 °F (36.3 °C)   Temp src Temporal   SpO2 96 %   O2 Device None (Room air)       Current Vitals:   Vital Signs  BP: (!) 154/102  Pulse: 77  Resp: 18  Temp: 97.4 °F (36.3 °C)  Temp src: Temporal    Oxygen Therapy  SpO2: 96 %  O2 Device: None (Room air)            Physical Exam  Vitals and nursing note reviewed.   Constitutional:       Appearance: Normal appearance. He is well-developed.   HENT:      Head: Normocephalic and atraumatic.   Cardiovascular:      Rate and Rhythm: Normal rate and regular rhythm.   Pulmonary:      Effort: Pulmonary effort is normal. No respiratory distress.   Musculoskeletal:      Comments: Right great toe: callus is present. There is a large fluid filled blister at the base of the great toe plantar surface. No erythema or tenderness. Neurovascular intact.    Skin:     General: Skin is warm and dry.      Capillary Refill: Capillary refill takes less than 2 seconds.   Neurological:      General: No focal deficit present.      Mental Status: He is alert.      Sensory: No sensory deficit.   Psychiatric:         Mood and Affect: Mood normal.         Behavior: Behavior normal.              ED Course   Labs Reviewed - No data to display     Serosanguinous fluid drained from blister with 18ga needle. Patient  tolerated well.               MDM                                      Medical Decision Making  Friction blister, infection both in differential. Exam findings consistent with benign blister. Topical antibiotic and dressing applied in IC. Follow up with podiatry.     Disposition and Plan     Clinical Impression:  1. Blister         Disposition:  Discharge  6/23/2024 12:13 pm    Follow-up:  No follow-up provider specified.        Medications Prescribed:  Discharge Medication List as of 6/23/2024 12:19 PM

## 2025-06-12 ENCOUNTER — APPOINTMENT (OUTPATIENT)
Dept: CT IMAGING | Age: 60
End: 2025-06-12
Attending: Physician Assistant
Payer: COMMERCIAL

## 2025-06-12 ENCOUNTER — HOSPITAL ENCOUNTER (OUTPATIENT)
Age: 60
Discharge: HOME | End: 2025-06-12
Payer: COMMERCIAL

## 2025-06-12 VITALS
OXYGEN SATURATION: 98 % | RESPIRATION RATE: 18 BRPM | TEMPERATURE: 99 F | SYSTOLIC BLOOD PRESSURE: 148 MMHG | HEART RATE: 68 BPM | DIASTOLIC BLOOD PRESSURE: 83 MMHG

## 2025-06-12 DIAGNOSIS — K57.92 ACUTE DIVERTICULITIS: Primary | ICD-10-CM

## 2025-06-12 LAB
#MXD IC: 0.8 X10ˆ3/UL (ref 0.1–1)
BUN BLD-MCNC: 29 MG/DL (ref 7–18)
CHLORIDE BLD-SCNC: 102 MMOL/L (ref 98–112)
CO2 BLD-SCNC: 27 MMOL/L (ref 21–32)
CREAT BLD-MCNC: 1.5 MG/DL (ref 0.7–1.3)
EGFRCR SERPLBLD CKD-EPI 2021: 52 ML/MIN/1.73M2 (ref 60–?)
GLUCOSE BLD-MCNC: 160 MG/DL (ref 70–99)
HCT VFR BLD AUTO: 49.7 % (ref 39–53)
HCT VFR BLD CALC: 51 % (ref 37–53)
HGB BLD-MCNC: 16 G/DL (ref 13–17.5)
ISTAT IONIZED CALCIUM FOR CHEM 8: 1.14 MMOL/L (ref 1.12–1.32)
LYMPHOCYTES # BLD AUTO: 1.9 X10ˆ3/UL (ref 1–4)
LYMPHOCYTES NFR BLD AUTO: 18.2 %
MCH RBC QN AUTO: 29.4 PG (ref 26–34)
MCHC RBC AUTO-ENTMCNC: 32.2 G/DL (ref 31–37)
MCV RBC AUTO: 91.2 FL (ref 80–100)
MIXED CELL %: 7.2 %
NEUTROPHILS # BLD AUTO: 7.8 X10ˆ3/UL (ref 1.5–7.7)
NEUTROPHILS NFR BLD AUTO: 74.6 %
PLATELET # BLD AUTO: 195 X10ˆ3/UL (ref 150–450)
POTASSIUM BLD-SCNC: 4.1 MMOL/L (ref 3.6–5.1)
RBC # BLD AUTO: 5.45 X10ˆ6/UL (ref 4.3–5.7)
SODIUM BLD-SCNC: 139 MMOL/L (ref 136–145)
WBC # BLD AUTO: 10.5 X10ˆ3/UL (ref 4–11)

## 2025-06-12 PROCEDURE — 96361 HYDRATE IV INFUSION ADD-ON: CPT

## 2025-06-12 PROCEDURE — 99215 OFFICE O/P EST HI 40 MIN: CPT

## 2025-06-12 PROCEDURE — 85025 COMPLETE CBC W/AUTO DIFF WBC: CPT | Performed by: PHYSICIAN ASSISTANT

## 2025-06-12 PROCEDURE — 80047 BASIC METABLC PNL IONIZED CA: CPT

## 2025-06-12 PROCEDURE — 99214 OFFICE O/P EST MOD 30 MIN: CPT

## 2025-06-12 PROCEDURE — 96374 THER/PROPH/DIAG INJ IV PUSH: CPT

## 2025-06-12 PROCEDURE — 74177 CT ABD & PELVIS W/CONTRAST: CPT | Performed by: PHYSICIAN ASSISTANT

## 2025-06-12 RX ORDER — SODIUM CHLORIDE 9 MG/ML
1000 INJECTION, SOLUTION INTRAVENOUS ONCE
Status: COMPLETED | OUTPATIENT
Start: 2025-06-12 | End: 2025-06-12

## 2025-06-12 RX ORDER — KETOROLAC TROMETHAMINE 30 MG/ML
15 INJECTION, SOLUTION INTRAMUSCULAR; INTRAVENOUS ONCE
Status: COMPLETED | OUTPATIENT
Start: 2025-06-12 | End: 2025-06-12

## 2025-06-12 NOTE — ED INITIAL ASSESSMENT (HPI)
Presents with LLQ pain, bloated sensation, and diarrhea that started this morning. No nausea. No fever. Denies urinary symptoms.

## 2025-06-12 NOTE — ED PROVIDER NOTES
Chief Complaint   Patient presents with    Abdominal Pain       History obtained from: patient   services not used     HPI:     Estefania Cowan is a 60 year old male who presents with abdominal pain since this morning. Patient localizes pain to left lower abdomen without radiation. Patient endorses intermittent diarrhea x 1 week. Patient continues to tolerate oral intake. Denies nausea, vomiting, fevers, chills, flank pain, urinary symptoms, blood in stool, groin pain, testicular pain. Patient endorses history of appendectomy and hernia repair.  Patient states he has never had a colonoscopy.  Of note, patient started amoxicillin today for dental infection.    PMH  Past Medical History[1]    PFS    PFS asessment screens reviewed and agree.  Nurses notes reviewed I agree with documentation.    Family History[2]  Family history reviewed with patient/caregiver and is not pertinent to presenting problem.  Social History     Socioeconomic History    Marital status:      Spouse name: Tatiana    Number of children: 0    Years of education: Not on file    Highest education level: Not on file   Occupational History    Occupation: auto body repair/     Employer: [employer]   Tobacco Use    Smoking status: Former     Current packs/day: 0.00     Average packs/day: 1.5 packs/day for 25.0 years (37.5 ttl pk-yrs)     Types: Cigarettes     Start date: 10/21/1995     Quit date: 10/21/2020     Years since quittin.6    Smokeless tobacco: Never    Tobacco comments:     quit 5 weeks   Vaping Use    Vaping status: Never Used   Substance and Sexual Activity    Alcohol use: Yes     Alcohol/week: 1.0 standard drink of alcohol     Types: 1 Shots of liquor per week     Comment: social    Drug use: No    Sexual activity: Not on file   Other Topics Concern     Service No    Blood Transfusions No    Caffeine Concern No    Occupational Exposure No    Hobby Hazards No    Sleep Concern Yes    Stress Concern  Yes    Weight Concern Yes    Special Diet Yes    Back Care Yes    Exercise No    Bike Helmet Not Asked    Seat Belt Yes    Self-Exams Not Asked   Social History Narrative    Not on file     Social Drivers of Health     Food Insecurity: Not on file   Transportation Needs: Not on file   Housing Stability: Not on file         ROS:   Positive for stated complaint: LLQ pain, diarrhea   Other systems are as noted in HPI.   All other systems reviewed and negative except as noted above.    Physical Exam:   Vital signs and nursing note reviewed.       /83   Pulse 68   Temp 98.6 °F (37 °C) (Oral)   Resp 18   SpO2 98%     GENERAL: well developed, no acute distress, non-toxic appearing   SKIN: good skin turgor, no obvious rashes  HEAD: normocephalic, atraumatic  EYES: sclera non-icteric bilaterally, conjunctiva clear bilaterally  OROPHARYNX: MMM, maintaining airway and secretions  NECK: no nuchal rigidity, no trismus, no edema, phonation normal    CARDIO: RRR, normal heart sounds   LUNGS: clear to auscultation bilaterally, no increased WOB  GI: normoactive bowel sounds, abdomen soft, soft easily reducible hernia to mid left abdominal wall, LLQ and suprapubic tenderness, no CVA tenderness bilaterally   EXTREMITIES: no cyanosis or edema, FLORIAN without difficulty  NEURO: no focal deficits  PSYCH: alert and oriented x3, answering questions appropriately, mood appropriate    MDM/Assessment/Plan:   Orders for this encounter:    Orders Placed This Encounter    CT ABDOMEN+PELVIS(CONTRAST ONLY)(CPT=74177)     If clinically indicated, CT Protocol includes Oral Contrast. Can Oral Contrast be administered?:   Yes     What is the Relevant Clinical Indication / Reason for Exam?:   LLQ pain     Release to patient:   Immediate    POCT CBC     Release to patient:   Immediate    POCT ISTAT chem8 cartridge    iStat (Chem 8)    Insert Peripheral IV    sodium chloride 0.9% infusion 1,000 mL    ketorolac (Toradol) 30 MG/ML injection 15 mg     iopamidol 76% (ISOVUE-370) injection for power injector    amoxicillin clavulanate 875-125 MG Oral Tab     Sig: Take 1 tablet by mouth 2 (two) times daily for 10 days.     Dispense:  20 tablet     Refill:  0       Labs performed this visit:  Recent Results (from the past 10 hours)   POCT CBC    Collection Time: 06/12/25  5:14 PM   Result Value Ref Range    WBC IC 10.5 4.0 - 11.0 x10ˆ3/uL    RBC IC 5.45 4.30 - 5.70 X10ˆ6/uL    HGB IC 16.0 13.0 - 17.5 g/dL    HCT IC 49.7 39.0 - 53.0 %    MCV IC 91.2 80.0 - 100.0 fL    MCH IC 29.4 26.0 - 34.0 pg    MCHC IC 32.2 31.0 - 37.0 g/dL    PLT .0 150.0 - 450.0 X10ˆ3/uL    # Neutrophil 7.8 (H) 1.5 - 7.7 X10ˆ3/uL    # Lymphocyte 1.9 1.0 - 4.0 X10ˆ3/uL    # Mixed Cells 0.8 0.1 - 1.0 X10ˆ3/uL    Neutrophil % 74.6 %    Lymphocyte % 18.2 %    Mixed Cell % 7.2 %   POCT ISTAT chem8 cartridge    Collection Time: 06/12/25  5:44 PM   Result Value Ref Range    ISTAT Sodium 139 136 - 145 mmol/L    ISTAT BUN 29 (H) 7 - 18 mg/dL    ISTAT Potassium 4.1 3.6 - 5.1 mmol/L    ISTAT Chloride 102 98 - 112 mmol/L    ISTAT Ionized Calcium 1.14 1.12 - 1.32 mmol/L    ISTAT Hematocrit 51 37 - 53 %    ISTAT Glucose 160 (H) 70 - 99 mg/dL    ISTAT TCO2 27 21 - 32 mmol/L    ISTAT Creatinine 1.50 (H) 0.70 - 1.30 mg/dL    eGFR-Cr 52 (L) >=60 mL/min/1.73m2       Imaging performed this visit:  CT ABDOMEN+PELVIS(CONTRAST ONLY)(CPT=74177)   Final Result   PROCEDURE: CT ABDOMEN + PELVIS (CONTRAST ONLY) (CPT=74177)       COMPARISON: [facility], CT ABDOMEN + PELVIS (CONTRAST    ONLY) (CPT=74177), 9/03/2020, 7:40 AM.       INDICATIONS: LLQ pain       TECHNIQUE: CT images of the abdomen and pelvis were obtained with    non-ionic intravenous contrast material.  Automated exposure control for    dose reduction was used. Adjustment of the mA and/or kV was done based on    the patient's size. Use of iterative    reconstruction technique for dose reduction was used.  Dose information is    transmitted  to the ACR (American College of Radiology) NRDR (National    Radiology Data Registry) which includes the Dose Index Registry.       FINDINGS:    LOWER THORAX:  No visible pulmonary or pleural disease.   LIVER:   Low-attenuation of the liver suggests hepatic steatosis.     Hepatomegaly measuring 21.2 cm.  No focal suspicious lesion.   GALLBLADDER: Normal size and appearance.    BILIARY:   No visible dilatation or calcification.     PANCREAS:   No lesion, fluid collection, ductal dilatation, or atrophy.     SPLEEN:   No enlargement or focal lesion.     ADRENALS:   No mass or enlargement.     KIDNEYS:   No mass or obstruction.  Excreted contrast in the renal pelves    obscures evaluation for renal stones.   RETROPERITONEUM:   No mass or enlarged adenopathy.     AORTA/VASCULAR:   No aneurysm.  Moderate calcified atherosclerosis.   PERITONEUM: See below.   GI TRACT/MESENTERY:    There is colonic diverticulosis.  Pericolic fat    stranding is noted at the proximal sigmoid/distal descending colon series    5, image 107 with mild wall thickening.  No pericolic fluid collection.     No free air.   URINARY BLADDER: Unremarkable.   REPRODUCTIVE ORGANS: Unremarkable.   ABDOMINAL WALL:   No acute abnormality.  Fat containing inguinal and    umbilical hernias.   BONES:  No acute fracture.  Moderate to advanced spondylosis.                       =====   CONCLUSION:        Acute uncomplicated diverticulitis of the distal descending/proximal    sigmoid colon.  No pericolic abscess or free air is identified.       Hepatomegaly and steatosis.       Additional chronic or incidental findings are described in the body of    this report.                       Dictated by (CST): Colby Arreola MD on 6/12/2025 at 6:37 PM        Finalized by (CST): Colby Arreola MD on 6/12/2025 at 6:45 PM                   Medical Decision Making  DDx includes diverticulitis versus diverticulosis versus gastroenteritis versus colitis versus other.  Patient is  overall well-appearing with stable vitals and tolerating oral intake presenting with left lower quadrant pain starting today and diarrhea over the past week.  No signs or symptoms of systemic illness.    CBC reviewed, grossly unremarkable without evidence of infection or anemia.  BMP reviewed, notable for hyperglycemia of 160 and elevated creatinine of 1.50 (baseline 1.3-1.6 on chart review), otherwise grossly unremarkable without evidence of further electrolyte derangements. CT abdomen/pelvis results reviewed, acute uncomplicated diverticulitis of the distal descending/proximal sigmoid colon, no signs of abscess or perforation.    Patient given IV fluids and low-dose of IV Toradol for pain.  On reassessment, patient resting comfortably and remains well-appearing.  No new or worsening symptoms throughout IC stay.  Discussed all results with patient.  Discussed diverticulitis diagnosis and treatment.  Rx Augmentin x 10 days, instructed patient to discontinue previously prescribed amoxicillin for dental infection.  Discussed supportive care including liquid to soft diet as tolerated, bland diet, increase fluid intake, and OTC Tylenol as needed for pain.  Instructed patient to go directly to nearest ER with any worsening or concerning symptoms.  Follow-up with PCP and GI.    Discussed case with supervising attending Dr. Lopez who is in agreement with assessment and plan.    Amount and/or Complexity of Data Reviewed  Labs: ordered.  Radiology: ordered.    Risk  OTC drugs.  Prescription drug management.        Diagnosis:    ICD-10-CM    1. Acute diverticulitis  K57.92           All results reviewed and discussed with patient/patient's family. Patient/patient's family verbalize excellent understanding of instructions and feels comfortable with plan. All of patient's/patient's family's questions were addressed.   See AVS for detailed discharge instructions for your condition today.    Follow Up with:  Osmin Davis,  DO  [address] [place] [address]  [phone]    Schedule an appointment as soon as possible for a visit       Tomer Bob MD  [address] [facility] [address]  [facility] [address]  [phone]      Gastroenterology      Note: This document was dictated using Dragon medical dictation software.  Proofreading was performed to the best of my ability, but errors may be present.    Fawn Fields PA-C         [1]   Past Medical History:   Acute appendicitis with localized peritonitis, without perforation, abscess, or gangrene    Appendicitis    Arrhythmia    CKD stage 3 due to type 2 diabetes mellitus (HCC)    ROBBINS (dyspnea on exertion)    High blood pressure    High cholesterol    Hypercholesterolemia    Obese    Obesity, unspecified    Osteoarthritis    Other and unspecified hyperlipidemia    Type II or unspecified type diabetes mellitus without mention of complication, not stated as uncontrolled    Ulcer of right great toe due to diabetes mellitus (HCC)    Unspecified essential hypertension    Unspecified sleep apnea   [2]   Family History  Problem Relation Age of Onset    Diabetes Father     Heart Disorder Father         MI    Other (Other) Father         renal failure    Psychiatric Mother         dementia

## 2025-06-13 NOTE — DISCHARGE INSTRUCTIONS
Do not take your metformin for 48 hours after receiving IV contrast    Stop taking previously prescribed amoxicillin from your dentist and fill prescription for Augmentin and complete entire course of antibiotic as directed    Eat a mostly liquid diet to give your bowels rest, advance to soft and bland diet as tolerated. Drink plenty of fluids    Take Tylenol as needed for pain     Follow-up with your primary care provider and GI    If you experience severe/worsening pain, inability to eat or drink, persistent vomiting, blood in the stool, fevers, or any other concerning symptoms, go directly to nearest ER immediately

## 2025-06-26 NOTE — ED AVS SNAPSHOT
[facility] Emergency Department    [place] [address]     [place] [address]    Phone:  [phone]    Fax:  [phone]           Soham Leroy   MRN: [id number]    Department:  [facility] Emergency Department   Date of Visit:  6/10 What Type Of Note Output Would You Prefer (Optional)?: Bullet Format Is This A New Presentation, Or A Follow-Up?: Skin Lesion Medication List      START taking these medications     Clindamycin HCl 300 MG Caps   Quantity:  30 capsule   Commonly known as:  CLEOCIN   Take 1 capsule (300 mg total) by mouth 3 (three) times daily.          CONTINUE taking these medications     BA You were examined and treated today on an urgent basis only. This was not a substitute for ongoing medical care. Often, one Emergency Department visit does not uncover every injury or illness.  If you have been referred to a primary care or a specialist ph Candido [address] [facility] ([address]) [address] [facility] [address]) [address]) [phone]   [facility] [employer]  ([address] [facility]) 32 Call [phone] for help. [email or web address] is NOT to be used for urgent needs. For medical emergencies, dial 911.

## 2025-07-12 ENCOUNTER — HOSPITAL ENCOUNTER (EMERGENCY)
Facility: HOSPITAL | Age: 60
Discharge: HOME | End: 2025-07-12
Attending: EMERGENCY MEDICINE
Payer: COMMERCIAL

## 2025-07-12 ENCOUNTER — APPOINTMENT (OUTPATIENT)
Dept: CT IMAGING | Facility: HOSPITAL | Age: 60
End: 2025-07-12
Attending: EMERGENCY MEDICINE
Payer: COMMERCIAL

## 2025-07-12 VITALS
OXYGEN SATURATION: 95 % | DIASTOLIC BLOOD PRESSURE: 98 MMHG | RESPIRATION RATE: 18 BRPM | TEMPERATURE: 98 F | HEART RATE: 69 BPM | SYSTOLIC BLOOD PRESSURE: 156 MMHG

## 2025-07-12 DIAGNOSIS — M54.50 ACUTE LEFT-SIDED LOW BACK PAIN WITHOUT SCIATICA: Primary | ICD-10-CM

## 2025-07-12 LAB
ALBUMIN SERPL-MCNC: 4.2 G/DL (ref 3.2–4.8)
ALBUMIN/GLOB SERPL: 1.8 {RATIO} (ref 1–2)
ALP LIVER SERPL-CCNC: 80 U/L (ref 45–117)
ALT SERPL-CCNC: 29 U/L (ref 10–49)
ANION GAP SERPL CALC-SCNC: 6 MMOL/L (ref 0–18)
AST SERPL-CCNC: 31 U/L (ref ?–34)
BASOPHILS # BLD AUTO: 0.05 X10(3) UL (ref 0–0.2)
BASOPHILS NFR BLD AUTO: 0.9 %
BILIRUB SERPL-MCNC: 0.6 MG/DL (ref 0.2–1.1)
BUN BLD-MCNC: 30 MG/DL (ref 9–23)
BUN/CREAT SERPL: 18 (ref 10–20)
CALCIUM BLD-MCNC: 9.6 MG/DL (ref 8.7–10.4)
CHLORIDE SERPL-SCNC: 102 MMOL/L (ref 98–112)
CO2 SERPL-SCNC: 31 MMOL/L (ref 21–32)
CREAT BLD-MCNC: 1.67 MG/DL (ref 0.7–1.3)
DEPRECATED RDW RBC AUTO: 41.1 FL (ref 35.1–46.3)
EGFRCR SERPLBLD CKD-EPI 2021: 46 ML/MIN/1.73M2 (ref 60–?)
EOSINOPHIL # BLD AUTO: 0.43 X10(3) UL (ref 0–0.7)
EOSINOPHIL NFR BLD AUTO: 8 %
ERYTHROCYTE [DISTWIDTH] IN BLOOD BY AUTOMATED COUNT: 12.9 % (ref 11–15)
GLOBULIN PLAS-MCNC: 2.4 G/DL (ref 2–3.5)
GLUCOSE BLD-MCNC: 197 MG/DL (ref 70–99)
HCT VFR BLD AUTO: 47 % (ref 39–53)
HGB BLD-MCNC: 16.1 G/DL (ref 13–17.5)
IMM GRANULOCYTES # BLD AUTO: 0.01 X10(3) UL (ref 0–1)
IMM GRANULOCYTES NFR BLD: 0.2 %
INR BLD: 3.16 (ref 0.8–1.2)
LYMPHOCYTES # BLD AUTO: 1.86 X10(3) UL (ref 1–4)
LYMPHOCYTES NFR BLD AUTO: 34.6 %
MCH RBC QN AUTO: 29.9 PG (ref 26–34)
MCHC RBC AUTO-ENTMCNC: 34.3 G/DL (ref 31–37)
MCV RBC AUTO: 87.2 FL (ref 80–100)
MONOCYTES # BLD AUTO: 0.47 X10(3) UL (ref 0.1–1)
MONOCYTES NFR BLD AUTO: 8.7 %
NEUTROPHILS # BLD AUTO: 2.56 X10 (3) UL (ref 1.5–7.7)
NEUTROPHILS # BLD AUTO: 2.56 X10(3) UL (ref 1.5–7.7)
NEUTROPHILS NFR BLD AUTO: 47.6 %
OSMOLALITY SERPL CALC.SUM OF ELEC: 300 MOSM/KG (ref 275–295)
PLATELET # BLD AUTO: 179 10(3)UL (ref 150–450)
POTASSIUM SERPL-SCNC: 4.5 MMOL/L (ref 3.5–5.1)
PROT SERPL-MCNC: 6.6 G/DL (ref 5.7–8.2)
PROTHROMBIN TIME: 33.9 SECONDS (ref 11.6–14.8)
RBC # BLD AUTO: 5.39 X10(6)UL (ref 4.3–5.7)
SODIUM SERPL-SCNC: 139 MMOL/L (ref 136–145)
WBC # BLD AUTO: 5.4 X10(3) UL (ref 4–11)

## 2025-07-12 PROCEDURE — 80053 COMPREHEN METABOLIC PANEL: CPT | Performed by: EMERGENCY MEDICINE

## 2025-07-12 PROCEDURE — 85610 PROTHROMBIN TIME: CPT | Performed by: EMERGENCY MEDICINE

## 2025-07-12 PROCEDURE — 36415 COLL VENOUS BLD VENIPUNCTURE: CPT

## 2025-07-12 PROCEDURE — 99284 EMERGENCY DEPT VISIT MOD MDM: CPT

## 2025-07-12 PROCEDURE — 74177 CT ABD & PELVIS W/CONTRAST: CPT | Performed by: RADIOLOGY

## 2025-07-12 PROCEDURE — 85025 COMPLETE CBC W/AUTO DIFF WBC: CPT | Performed by: EMERGENCY MEDICINE

## 2025-07-12 PROCEDURE — 99285 EMERGENCY DEPT VISIT HI MDM: CPT

## 2025-07-12 NOTE — ED INITIAL ASSESSMENT (HPI)
Pt c/o left sided lower back pain x 2 weeks. Pt states 3 weeks ago dx with diverticulitis, abd pain went away after abx but still having left sided back pain. Pt states since last night unable to walk. Denies bending/injury/trauma/falls.

## 2025-07-12 NOTE — ED PROVIDER NOTES
Patient Seen in: [facility] Emergency Department        History  Chief Complaint   Patient presents with    Back Pain     Stated Complaint: Back Pain lower left    Subjective:   HPI            60-year-old male history of CKD, diabetes presents with left lower back pain.  Patient recently treated for diverticulitis, states for the last week after being treated his abdominal pain is resolved but now has left lower back pain that does not radiate.  States pain is worse with bowel movements, though he is suspicious this is due to him twisting his thorax to wipe his bottom after defecating.  States he has been having some loose stools.  No bowel or bladder dysfunction otherwise, saddle anesthesia, weakness or numbness of legs, difficulty walking      Objective:     Past Medical History:    Acute appendicitis with localized peritonitis, without perforation, abscess, or gangrene    Appendicitis    Arrhythmia    CKD stage 3 due to type 2 diabetes mellitus (HCC)    ROBBINS (dyspnea on exertion)    High blood pressure    High cholesterol    Hypercholesterolemia    Obese    Obesity, unspecified    Osteoarthritis    Other and unspecified hyperlipidemia    Type II or unspecified type diabetes mellitus without mention of complication, not stated as uncontrolled    Ulcer of right great toe due to diabetes mellitus (HCC)    Unspecified essential hypertension    Unspecified sleep apnea              Past Surgical History:   Procedure Laterality Date    Appendectomy      Knee scope,med&lat menis shav Right 9/4/2015    Procedure: ARTHROSCOPY KNEE RIGHT;  Surgeon: Andres Valencia MD;  Location: [facility]    Other surgical history      Sinus surgery        Tonsillectomy                  Social History     Socioeconomic History    Marital status:      Spouse name: Tatiana    Number of children: 0   Occupational History    Occupation: auto body repair/     Employer: [employer]   Tobacco Use    Smoking status:  Former     Current packs/day: 0.00     Average packs/day: 1.5 packs/day for 25.0 years (37.5 ttl pk-yrs)     Types: Cigarettes     Start date: 10/21/1995     Quit date: 10/21/2020     Years since quittin.7    Smokeless tobacco: Never    Tobacco comments:     quit 5 weeks   Vaping Use    Vaping status: Never Used   Substance and Sexual Activity    Alcohol use: Yes     Alcohol/week: 1.0 standard drink of alcohol     Types: 1 Shots of liquor per week     Comment: social    Drug use: No   Other Topics Concern     Service No    Blood Transfusions No    Caffeine Concern No    Occupational Exposure No    Hobby Hazards No    Sleep Concern Yes    Stress Concern Yes    Weight Concern Yes    Special Diet Yes    Back Care Yes    Exercise No    Seat Belt Yes                                Physical Exam    ED Triage Vitals [25 0942]   /84   Pulse 74   Resp 22   Temp 98.2 °F (36.8 °C)   Temp src Oral   SpO2 96 %   O2 Device None (Room air)       Current Vitals:   Vital Signs  BP: (!) 156/98  Pulse: 60  Resp: 12  Temp: 98.2 °F (36.8 °C)  Temp src: Oral  MAP (mmHg): (!) 110    Oxygen Therapy  SpO2: 94 %  O2 Device: None (Room air)            Physical Exam  Vital signs reviewed. Nursing note reviewed.  Constitutional: Well-developed. Well-nourished. In no acute distress  HENT: Mucous membranes moist.   EYES: No scleral icterus or conjunctival injection.  NECK: Full ROM. Supple.   CARDIAC: Normal rate. No peripheral edema  PULM/CHEST: Non-labored breathing  ABD: Non distended, non tender  BACK: No T or L spine tenderness  RECTAL: deferred  Extremities: Full ROM  NEURO: Awake, alert, 5/5 strength in hip flexors, knee flexion/extension, plantar/dorsiflexion bilaterally. Equal sensation in both legs. No saddle anesthesia  SKIN: Warm and dry. No rash or lesions.  PSYCH: Normal judgment. Normal affect.            ED Course  Labs Reviewed   COMP METABOLIC PANEL (14) - Abnormal; Notable for the following components:        Result Value    Glucose 197 (*)     BUN 30 (*)     Creatinine 1.67 (*)     Calculated Osmolality 300 (*)     eGFR-Cr 46 (*)     All other components within normal limits   PROTHROMBIN TIME (PT) - Abnormal; Notable for the following components:    PT 33.9 (*)     INR 3.16 (*)     All other components within normal limits   CBC WITH DIFFERENTIAL WITH PLATELET   RAINBOW DRAW LAVENDER   RAINBOW DRAW LIGHT GREEN   RAINBOW DRAW BLUE                            MDM     Assessment:Patient is a 60 year old male presenting to the ED due to left lower back pain.    Comorbidities/chronic illnesses impacting care: diabetes, recent diverticulitis, anticoagulation with warfarin    History obtained from: patient    External records and previous hospitalization records reviewed and documented below    Consideration of Social Determinants of Health and Impact on Medical Decision Making:  Housing/Transportation/Financial Strain/Access to healthcare/Food insecurity/family or Community support/Language and Literacy/Substance abuse/Mental health concerns/Disabilities     -none    Radiography/Imaging:  CT ABDOMEN+PELVIS(CONTRAST ONLY)(CPT=74177)   Final Result   PROCEDURE: CT ABDOMEN+PELVIS(CONTRAST ONLY)(CPT=74177)         INDICATIONS: LLQ pain, left lower back pain, prior diverticulitis          COMPARISON: 6/12/2025, 9/3/2020      TECHNIQUE: Multislice CT scanning was performed from the dome of the    diaphragm to the pubic symphysis with non-ionic intravenous contrast    material. Post contrast coronal MPR imaging was performed. Automated    exposure control dose reduction techniques were    used. Adjustment of the mA and/or kV was done based on the patient's    size. Use of iterative reconstruction technique for dose reduction was    used. Dose information is transmitted to the ACR (American College of    Radiology) NRDR (National Radiology Data    Registry) which includes the Dose Index Registry.      CONTRAST: IOPAMIDOL 76% IV  SOLN FOR POWER INJECTOR:80 mL      FINDINGS:      LIVER: Moderate hepatic steatosis.      BILIARY: No evidence for cholecystitis or biliary dilatation.      SPLEEN: Normal.      PANCREAS: Normal.      ADRENALS: Normal.      KIDNEYS: Stable left renal cyst. No solid renal lesion or renal    obstruction.      AORTA/VASCULAR: Atherosclerotic vascular calcification. No aneurysm or    dissection.      LYMPHADENOPATHY: None.      GI/MESENTERY: Post appendectomy. Colonic diverticulosis. Resolution of    previously seen diverticulitis. Large amount of stool in colon. No    obstruction, bowel wall thickening, or mesenteric mass.       ABDOMINAL WALL: Stable fat-containing umbilical hernia.      URINARY BLADDER: Normal.      ASCITES: None.      PELVIC ORGANS: Mild mild to moderate stable prostate enlargement.      BONES: No suspect bone lesion or acute fracture..      LUNG BASES: 3 mm peripheral right lower lobe pulmonary nodule unchanged    from 9/3/2020      OTHER: Negative.         =====   CONCLUSION: No acute finding.       Electronically Verified and Signed by Attending Radiologist: Geovanny Beard MD 7/12/2025 11:53 AM   Workstation: [id number]              ED course  Patient arrives here mildly hypertensive.  He appears nontoxic.  No neurologic dysfunction in lower extremities.  No red flag signs or symptoms of low back pain.  With his anticoagulation, concern for retroperitoneal bleeding, complications from his diverticulitis, versus radicular low back pain symptoms.  Will check labs, INR, CT abdomen pelvis.  If negative workup, suspect musculoskeletal pain, could discharge with reassurance    Laboratory results above were independently viewed and interpreted as: Mildly supratherapeutic INR, mild worsening renal function, no leukocytosis, mild hyperglycemia  Radiology: ordered and independently interpreted as: CT abdomen pelvis negative for signs of diverticulitis, retroperitoneal hematoma    Discussed with  patient reassuring CT findings.  Will discharge home with continued pain control, ambulation, return if symptoms worsen or change, primary follow-up            Medications   iopamidol 76% (ISOVUE-370) injection for power injector (80 mL Intravenous Given 7/12/25 1122)                 Medical Decision Making      Disposition and Plan     Clinical Impression:  1. Acute left-sided low back pain without sciatica         Disposition:  Discharge  7/12/2025 12:12 pm    Follow-up:  [facility] Emergency Department  [address]  [facility] [id number] [phone]  Follow up  If symptoms worsen    Srinivasa Do,   [address] [place] [id number] [phone]    Schedule an appointment as soon as possible for a visit            Medications Prescribed:  Current Discharge Medication List                Supplementary Documentation:

## 2025-07-12 NOTE — DISCHARGE INSTRUCTIONS
Please return to ER if:  1. Loss of stool or urine  2. Groin numbness  3. Fever  4. Leg weakness/falls  5. Any other concerns

## (undated) DEVICE — TROCAR: Brand: KII FIOS FIRST ENTRY

## (undated) DEVICE — PSI-TEC TUBING: Brand: PSI-TEC TUBING

## (undated) DEVICE — HOVERMATT 34IN SINGLE USE

## (undated) DEVICE — DERMABOND LIQUID ADHESIVE

## (undated) DEVICE — ENDOPATH ETS-FLEX45 ARTICULATING ENDOSCOPIC LINEAR CUTTER, NO RELOAD: Brand: ENDOPATH

## (undated) DEVICE — ENDOPATH ETS45 2.5MM RELOADS (VASCULAR/THIN): Brand: ENDOPATH

## (undated) DEVICE — LAP CHOLE: Brand: MEDLINE INDUSTRIES, INC.

## (undated) DEVICE — [HIGH FLOW INSUFFLATOR,  DO NOT USE IF PACKAGE IS DAMAGED,  KEEP DRY,  KEEP AWAY FROM SUNLIGHT,  PROTECT FROM HEAT AND RADIOACTIVE SOURCES.]: Brand: PNEUMOSURE

## (undated) DEVICE — TISSUE RETRIEVAL SYSTEM: Brand: INZII RETRIEVAL SYSTEM

## (undated) DEVICE — SUTURE VICRYL 0 UR-6

## (undated) DEVICE — SUTURE VICRYL 3-0 SH

## (undated) DEVICE — ETS45 RELOAD STANDARD 45MM: Brand: ENDOPATH

## (undated) DEVICE — POOLE SUCTION INSTRUMENT WITH REMOVABLE SHEATH: Brand: POOLE

## (undated) DEVICE — SOL  .9 3000ML

## (undated) DEVICE — SUTURE PDS II 0 CT-1

## (undated) DEVICE — SOL  .9 1000ML BTL

## (undated) DEVICE — UNDYED BRAIDED (POLYGLACTIN 910), SYNTHETIC ABSORBABLE SUTURE: Brand: COATED VICRYL

## (undated) DEVICE — DRAPE SHEET LAPAROTOMY

## (undated) DEVICE — ENCORE® LATEX ACCLAIM SIZE 8, STERILE LATEX POWDER-FREE SURGICAL GLOVE: Brand: ENCORE

## (undated) DEVICE — ENCORE® LATEX MICRO SIZE 8, STERILE LATEX POWDER-FREE SURGICAL GLOVE: Brand: ENCORE

## (undated) DEVICE — TROCAR: Brand: KII® SLEEVE

## (undated) DEVICE — Device

## (undated) NOTE — ED AVS SNAPSHOT
[facility] Emergency Department    [address]     [place] [address]    Phone:  [phone]    Fax:  [phone]           Alecia Elliott   MRN: [id number]    Department:  [facility] Emergency Department   Date of Visit:  6/10 and Class Registration line at [phone] or find a doctor online by visiting [email or web address].    IF THERE IS ANY CHANGE OR WORSENING OF YOUR CONDITION, CALL YOUR PRIMARY CARE PHYSICIAN AT ONCE OR RETURN IMMEDIATELY TO [address] [place] [address].     If

## (undated) NOTE — LETTER
[address], [place]  Authorization for Invasive Procedures  1.  I hereby authorize Dr. Chely Vazquez , my physician and whomever may be designated as the doctor's assistant, to perform the following operation and/or procedure: Cardiac cat products.  Further, I understand that despite careful testing and screening of blood and blood products, I may still be subject to ill effects as a result of recieving a blood transfusion an/or blood producst. The following are some, but not all, of the pot OPERATION and/or OTHER PROCEDURE. 11. I acknowledge that my physician has explained sedation/analgesia administration to me including the risks and benefits.  I consent to the administration of sedation/analgesia as may be necessary or desirable in the